# Patient Record
Sex: FEMALE | Race: WHITE | NOT HISPANIC OR LATINO | Employment: OTHER | URBAN - METROPOLITAN AREA
[De-identification: names, ages, dates, MRNs, and addresses within clinical notes are randomized per-mention and may not be internally consistent; named-entity substitution may affect disease eponyms.]

---

## 2023-03-29 DIAGNOSIS — E11.65 TYPE 2 DIABETES MELLITUS WITH HYPERGLYCEMIA, WITHOUT LONG-TERM CURRENT USE OF INSULIN (MULTI): Primary | ICD-10-CM

## 2023-03-29 DIAGNOSIS — T78.40XS ALLERGY, SEQUELA: Primary | ICD-10-CM

## 2023-03-29 RX ORDER — LANCETS
1 EACH MISCELLANEOUS DAILY
Qty: 100 EACH | Refills: 0 | Status: SHIPPED | OUTPATIENT
Start: 2023-03-29 | End: 2023-05-24

## 2023-03-29 RX ORDER — LANCETS
1 EACH MISCELLANEOUS DAILY
COMMUNITY
Start: 2023-02-05 | End: 2023-03-29 | Stop reason: SDUPTHER

## 2023-03-30 RX ORDER — FLUTICASONE PROPIONATE 50 MCG
SPRAY, SUSPENSION (ML) NASAL
Qty: 9.9 G | Refills: 10 | Status: SHIPPED | OUTPATIENT
Start: 2023-03-30 | End: 2024-05-06

## 2023-04-04 DIAGNOSIS — E11.65 TYPE 2 DIABETES MELLITUS WITH HYPERGLYCEMIA, WITHOUT LONG-TERM CURRENT USE OF INSULIN (MULTI): Primary | ICD-10-CM

## 2023-04-04 RX ORDER — PEN NEEDLE, DIABETIC 29 G X1/2"
NEEDLE, DISPOSABLE MISCELLANEOUS
Qty: 100 EACH | Refills: 11 | Status: SHIPPED | OUTPATIENT
Start: 2023-04-04 | End: 2024-04-03

## 2023-05-03 PROBLEM — K43.9 VENTRAL HERNIA: Status: ACTIVE | Noted: 2023-05-03

## 2023-05-03 PROBLEM — F39: Status: ACTIVE | Noted: 2023-05-03

## 2023-05-03 PROBLEM — F42.9 OBSESSIVE-COMPULSIVE DISORDER: Status: ACTIVE | Noted: 2023-05-03

## 2023-05-03 PROBLEM — D72.819 LEUKOPENIA: Status: ACTIVE | Noted: 2023-05-03

## 2023-05-03 PROBLEM — J02.9 SORE THROAT: Status: ACTIVE | Noted: 2023-05-03

## 2023-05-03 PROBLEM — R19.7 DIARRHEA: Status: ACTIVE | Noted: 2023-05-03

## 2023-05-03 PROBLEM — N95.0 PMB (POSTMENOPAUSAL BLEEDING): Status: ACTIVE | Noted: 2023-05-03

## 2023-05-03 PROBLEM — E53.9 VITAMIN B DEFICIENCY: Status: ACTIVE | Noted: 2023-05-03

## 2023-05-03 PROBLEM — E03.9 HYPOTHYROIDISM: Status: ACTIVE | Noted: 2023-05-03

## 2023-05-03 PROBLEM — R51.9 HEADACHE: Status: ACTIVE | Noted: 2023-05-03

## 2023-05-03 PROBLEM — K21.9 CHRONIC GERD: Status: ACTIVE | Noted: 2023-05-03

## 2023-05-03 PROBLEM — R25.1 TREMOR: Status: ACTIVE | Noted: 2023-05-03

## 2023-05-03 PROBLEM — M21.619 BUNION: Status: ACTIVE | Noted: 2023-05-03

## 2023-05-03 PROBLEM — F51.05 INSOMNIA RELATED TO ANOTHER MENTAL DISORDER: Status: ACTIVE | Noted: 2023-05-03

## 2023-05-03 PROBLEM — G47.9 SLEEP DISTURBANCES: Status: ACTIVE | Noted: 2023-05-03

## 2023-05-03 PROBLEM — F32.A DEPRESSION: Status: ACTIVE | Noted: 2023-05-03

## 2023-05-03 PROBLEM — H04.123 DRY EYES: Status: ACTIVE | Noted: 2023-05-03

## 2023-05-03 PROBLEM — Q90.9 TRISOMY 21, DOWN SYNDROME (HHS-HCC): Status: ACTIVE | Noted: 2023-05-03

## 2023-05-03 PROBLEM — J30.2 SEASONAL ALLERGIES: Status: ACTIVE | Noted: 2023-05-03

## 2023-05-03 PROBLEM — E55.9 VITAMIN D DEFICIENCY: Status: ACTIVE | Noted: 2023-05-03

## 2023-05-03 PROBLEM — K46.9 ABDOMINAL HERNIA: Status: ACTIVE | Noted: 2023-05-03

## 2023-05-03 PROBLEM — K13.0 CRACKED LIPS: Status: ACTIVE | Noted: 2023-05-03

## 2023-05-03 PROBLEM — R01.1 SYSTOLIC MURMUR: Status: ACTIVE | Noted: 2023-05-03

## 2023-05-03 PROBLEM — G47.30 SLEEP APNEA: Status: ACTIVE | Noted: 2023-05-03

## 2023-05-03 PROBLEM — E11.9 DIABETES MELLITUS (MULTI): Status: ACTIVE | Noted: 2023-05-03

## 2023-05-03 PROBLEM — R26.81 GAIT INSTABILITY: Status: ACTIVE | Noted: 2023-05-03

## 2023-05-03 PROBLEM — F42.4 DERMATILLOMANIA: Status: ACTIVE | Noted: 2023-05-03

## 2023-05-03 PROBLEM — E78.5 DYSLIPIDEMIA: Status: ACTIVE | Noted: 2023-05-03

## 2023-05-03 PROBLEM — E04.1 THYROID NODULE: Status: ACTIVE | Noted: 2023-05-03

## 2023-05-03 PROBLEM — R45.4 IRRITABILITY: Status: ACTIVE | Noted: 2023-05-03

## 2023-05-03 PROBLEM — B37.9 YEAST INFECTION: Status: ACTIVE | Noted: 2023-05-03

## 2023-05-03 PROBLEM — R63.5 WEIGHT GAIN: Status: ACTIVE | Noted: 2023-05-03

## 2023-05-03 PROBLEM — Q23.1 BICUSPID AORTIC VALVE (HHS-HCC): Status: ACTIVE | Noted: 2023-05-03

## 2023-05-03 PROBLEM — H90.0 CONDUCTIVE HEARING LOSS, BILATERAL: Status: ACTIVE | Noted: 2023-05-03

## 2023-05-03 PROBLEM — Q23.81 BICUSPID AORTIC VALVE: Status: ACTIVE | Noted: 2023-05-03

## 2023-05-03 PROBLEM — G47.19 EXCESSIVE DAYTIME SLEEPINESS: Status: ACTIVE | Noted: 2023-05-03

## 2023-05-03 PROBLEM — F71 MODERATE INTELLECTUAL DISABILITY WITH INTELLIGENCE QUOTIENT 35 TO 49: Status: ACTIVE | Noted: 2023-05-03

## 2023-05-04 ENCOUNTER — OFFICE VISIT (OUTPATIENT)
Dept: PRIMARY CARE | Facility: CLINIC | Age: 58
End: 2023-05-04
Payer: MEDICARE

## 2023-05-04 VITALS
BODY MASS INDEX: 32.71 KG/M2 | SYSTOLIC BLOOD PRESSURE: 112 MMHG | OXYGEN SATURATION: 98 % | HEIGHT: 56 IN | DIASTOLIC BLOOD PRESSURE: 80 MMHG | RESPIRATION RATE: 16 BRPM | TEMPERATURE: 97.2 F | HEART RATE: 56 BPM | WEIGHT: 145.4 LBS

## 2023-05-04 DIAGNOSIS — E11.65 TYPE 2 DIABETES MELLITUS WITH HYPERGLYCEMIA, WITHOUT LONG-TERM CURRENT USE OF INSULIN (MULTI): ICD-10-CM

## 2023-05-04 DIAGNOSIS — Z12.31 ENCOUNTER FOR SCREENING MAMMOGRAM FOR MALIGNANT NEOPLASM OF BREAST: Primary | ICD-10-CM

## 2023-05-04 DIAGNOSIS — F39 PSYCHOSIS, AFFECTIVE (CMS-HCC): ICD-10-CM

## 2023-05-04 DIAGNOSIS — M54.6 ACUTE LEFT-SIDED THORACIC BACK PAIN: ICD-10-CM

## 2023-05-04 DIAGNOSIS — Q90.9 TRISOMY 21, DOWN SYNDROME (HHS-HCC): ICD-10-CM

## 2023-05-04 DIAGNOSIS — E11.69 TYPE 2 DIABETES MELLITUS WITH OTHER SPECIFIED COMPLICATION, WITHOUT LONG-TERM CURRENT USE OF INSULIN (MULTI): ICD-10-CM

## 2023-05-04 PROBLEM — B37.9 YEAST INFECTION: Status: RESOLVED | Noted: 2023-05-03 | Resolved: 2023-05-04

## 2023-05-04 PROBLEM — G47.9 SLEEP DISTURBANCES: Status: RESOLVED | Noted: 2023-05-03 | Resolved: 2023-05-04

## 2023-05-04 PROBLEM — R51.9 HEADACHE: Status: RESOLVED | Noted: 2023-05-03 | Resolved: 2023-05-04

## 2023-05-04 PROBLEM — R01.1 SYSTOLIC MURMUR: Status: RESOLVED | Noted: 2023-05-03 | Resolved: 2023-05-04

## 2023-05-04 PROBLEM — J02.9 SORE THROAT: Status: RESOLVED | Noted: 2023-05-03 | Resolved: 2023-05-04

## 2023-05-04 LAB — POC HEMOGLOBIN A1C: 6.1 % (ref 4.2–6.5)

## 2023-05-04 PROCEDURE — 1036F TOBACCO NON-USER: CPT | Performed by: INTERNAL MEDICINE

## 2023-05-04 PROCEDURE — 99214 OFFICE O/P EST MOD 30 MIN: CPT | Performed by: INTERNAL MEDICINE

## 2023-05-04 PROCEDURE — 3079F DIAST BP 80-89 MM HG: CPT | Performed by: INTERNAL MEDICINE

## 2023-05-04 PROCEDURE — 83036 HEMOGLOBIN GLYCOSYLATED A1C: CPT | Performed by: INTERNAL MEDICINE

## 2023-05-04 PROCEDURE — 3074F SYST BP LT 130 MM HG: CPT | Performed by: INTERNAL MEDICINE

## 2023-05-04 RX ORDER — ERYTHROMYCIN 5 MG/G
OINTMENT OPHTHALMIC
COMMUNITY
Start: 2021-04-05

## 2023-05-04 RX ORDER — OLANZAPINE 2.5 MG/1
1 TABLET ORAL NIGHTLY
COMMUNITY
Start: 2021-02-15 | End: 2023-11-09 | Stop reason: SDUPTHER

## 2023-05-04 RX ORDER — EMPAGLIFLOZIN 10 MG/1
1 TABLET, FILM COATED ORAL DAILY
COMMUNITY
Start: 2022-10-12 | End: 2023-09-28

## 2023-05-04 RX ORDER — GUAIFENESIN 600 MG/1
TABLET, EXTENDED RELEASE ORAL
COMMUNITY
Start: 2021-08-05

## 2023-05-04 RX ORDER — CETIRIZINE HYDROCHLORIDE 10 MG/1
1 TABLET ORAL DAILY
COMMUNITY
Start: 2021-06-10 | End: 2023-10-26

## 2023-05-04 RX ORDER — ACETAMINOPHEN 500 MG
TABLET ORAL
COMMUNITY
Start: 2021-02-11

## 2023-05-04 RX ORDER — ASPIRIN 81 MG/1
1 TABLET ORAL DAILY
COMMUNITY
Start: 2022-08-05 | End: 2023-10-26

## 2023-05-04 RX ORDER — ROSE OIL
OIL (ML) MISCELLANEOUS
COMMUNITY

## 2023-05-04 RX ORDER — ERGOCALCIFEROL 1.25 MG/1
50000 CAPSULE ORAL
COMMUNITY
Start: 2013-01-07 | End: 2023-10-26

## 2023-05-04 RX ORDER — CICLOPIROX OLAMINE 7.7 MG/G
CREAM TOPICAL
COMMUNITY
Start: 2023-04-06

## 2023-05-04 RX ORDER — TALC
POWDER (GRAM) TOPICAL
COMMUNITY
Start: 2020-12-01 | End: 2023-11-09 | Stop reason: SDUPTHER

## 2023-05-04 RX ORDER — FENOFIBRATE 145 MG/1
145 TABLET, FILM COATED ORAL DAILY
COMMUNITY
Start: 2021-11-24 | End: 2023-10-26

## 2023-05-04 RX ORDER — AMMONIUM LACTATE 12 G/100G
CREAM TOPICAL
COMMUNITY
Start: 2022-06-15

## 2023-05-04 RX ORDER — KETOCONAZOLE 20 MG/G
CREAM TOPICAL
COMMUNITY
Start: 2022-02-15

## 2023-05-04 RX ORDER — LEVOTHYROXINE SODIUM 50 UG/1
50 TABLET ORAL
COMMUNITY
Start: 2021-02-18 | End: 2023-10-26

## 2023-05-04 RX ORDER — OMEPRAZOLE 20 MG/1
20 CAPSULE, DELAYED RELEASE ORAL
COMMUNITY
Start: 2013-07-18 | End: 2023-10-26

## 2023-05-04 RX ORDER — SIMVASTATIN 40 MG/1
40 TABLET, FILM COATED ORAL NIGHTLY
COMMUNITY
Start: 2021-10-19 | End: 2023-10-26

## 2023-05-04 RX ORDER — LANOLIN ALCOHOL/MO/W.PET/CERES
1 CREAM (GRAM) TOPICAL DAILY
COMMUNITY
Start: 2021-11-24 | End: 2023-10-26

## 2023-05-04 ASSESSMENT — ENCOUNTER SYMPTOMS
NERVOUS/ANXIOUS: 0
ARTHRALGIAS: 1
DYSURIA: 0
POLYDIPSIA: 0
UNEXPECTED WEIGHT CHANGE: 0
EYE PAIN: 0
FREQUENCY: 0
CHILLS: 0
DIZZINESS: 0
MYALGIAS: 0
POLYPHAGIA: 0
DIARRHEA: 0
NAUSEA: 0
BLOOD IN STOOL: 0
ABDOMINAL PAIN: 0
RHINORRHEA: 0
CHEST TIGHTNESS: 0
SHORTNESS OF BREATH: 0
CONSTIPATION: 0
PALPITATIONS: 0
COUGH: 0
WOUND: 0
HEMATURIA: 0
DYSPHORIC MOOD: 0
FEVER: 0
WHEEZING: 0
SORE THROAT: 0
VOMITING: 0
HEADACHES: 0

## 2023-05-04 NOTE — PATIENT INSTRUCTIONS
She is due for labs before her next visit in 4 months. This is fasting!    I am ordering an xray of thoracic spine  If back continues to hurt, let us know    A1c is 6.1 which is great!    She is due for mammogram in September

## 2023-05-04 NOTE — PROGRESS NOTES
"Subjective   Patient ID: Ene Grant is a 57 y.o. female who presents for Follow-up (3 month follow up and A1C check.//The house noticed that pt has a lump on her back that does not seem to hurt her unless its pressed on, pt seems derm next week.).    HPI     Here with Bri  Have May sugars and 100s in AM and 130-150 in evening  Maira states feels good  She has a pain on back. Only noticed when staff helping with bra. She has a mole and sees derm. ? Of a bump  Maira denies pain or injury until pressed      Review of Systems   Constitutional:  Negative for chills, fever and unexpected weight change.   HENT:  Negative for congestion, hearing loss, rhinorrhea and sore throat.    Eyes:  Negative for pain and visual disturbance.   Respiratory:  Negative for cough, chest tightness, shortness of breath and wheezing.    Cardiovascular:  Negative for chest pain and palpitations.   Gastrointestinal:  Negative for abdominal pain, blood in stool, constipation, diarrhea, nausea and vomiting.   Endocrine: Negative for cold intolerance, heat intolerance, polydipsia and polyphagia.   Genitourinary:  Negative for dysuria, frequency and hematuria.   Musculoskeletal:  Positive for arthralgias. Negative for myalgias.   Skin:  Negative for rash and wound.   Neurological:  Negative for dizziness, syncope and headaches.   Psychiatric/Behavioral:  Negative for dysphoric mood. The patient is not nervous/anxious.        Objective   /80   Pulse 56   Temp 36.2 °C (97.2 °F)   Resp 16   Ht 1.422 m (4' 8\")   Wt 66 kg (145 lb 6.4 oz)   SpO2 98%   BMI 32.60 kg/m²     Physical Exam  Constitutional:       Appearance: Normal appearance.      Comments: Down syndrome faces   Cardiovascular:      Rate and Rhythm: Normal rate and regular rhythm.      Heart sounds: Normal heart sounds. No murmur heard.     No gallop.   Pulmonary:      Effort: Pulmonary effort is normal. No respiratory distress.      Breath sounds: Normal breath sounds. "   Musculoskeletal:      Left lower leg: No edema.   Skin:     Comments: Irregular mole on back  Ttp just right of mole. No lump. No deformities. ? Slight swelling   Neurological:      Mental Status: She is alert.         Assessment/Plan   Problem List Items Addressed This Visit          Endocrine/Metabolic    Diabetes mellitus (CMS/HCC)    Relevant Orders    Hemoglobin A1C    Lipid Panel    Albumin , Urine Random    Comprehensive Metabolic Panel    POCT glycosylated hemoglobin (Hb A1C) manually resulted (Completed)       Other    Psychosis, affective (CMS/HCC)    Relevant Orders    CBC    Trisomy 21, Down syndrome     Other Visit Diagnoses       Encounter for screening mammogram for malignant neoplasm of breast    -  Primary    Relevant Orders    BI mammo bilateral screening tomosynthesis    Acute left-sided thoracic back pain        Relevant Orders    XR thoracic spine 3 views          Thoracic back pain: check xray  -? [facility]  -advised mole should not cause pain but agree with getting the mole checked  -if continues-will recheck    Ventral hernia: saw surgery for this    Tremor: med induced, saw neuro, from zyprexa/rexulti  -resolved    Thyroid nodule: s/p left sided thyroidectomy  -healing well  -TSH WNL 12/22 at ENT at [facility]    Type 2 diabetes: 12/20: 6.0--> 6.8--> 7.1--> 6.8--> 7.7--> 7.6--> 6.6--> 6.1  -Higher doses of metformin cause diarrhea  -On metformin and Jardiance  -saw endo and they agree with our plan (family wanted her to see)  -f/u 3 months    Biscupid aortic valve with AS: seeing Dr. Lewis  -asymptomatic    Leukopenia: saw heme and med induced  -monitor    Hyperlipidemia: on simvastatin and fenofibrate  -8/22 controlled    Hypothyroidism: on synthroid  -12/22 controlled, getting checked with ENT    Behavioral issues: sees psych  -on zoloft and rexulti    Down syndrome: lives at [facility]    Vitamin b12/D deficiency: WNL 12/20    Headache: prn tylenol  GERD: on PPI    Allergies: on zyrtec and  flonase    Return 4 months for a1c and labs before  Eye doc; Dr. Gonzalez  GI: Dr. Ruiz ([facility])  Derm: Dr. Oneil  ENT-- Dr. YESSICA Bob    Health Maintenance  -Pap smear: sees gyn  -Vaccinations: flu shot UTD. Pneumovax booster, shingrix, and tdap (2012). Advised tdap  -Mammogram: 9/22---neg, given order for August  -Colonoscopy: 11/9/17-normal, repeat 10 years (Dr. Ruiz)-records received  -DEXA: at 66

## 2023-05-08 ENCOUNTER — TELEPHONE (OUTPATIENT)
Dept: PRIMARY CARE | Facility: CLINIC | Age: 58
End: 2023-05-08
Payer: MEDICARE

## 2023-05-08 NOTE — TELEPHONE ENCOUNTER
----- Message from Micki Matthew MD sent at 5/7/2023  5:14 PM EDT -----  Will you let [facility] know xray shows arthritis? If continues to complain over pain over the next few weeks-let us know

## 2023-05-24 DIAGNOSIS — E11.65 TYPE 2 DIABETES MELLITUS WITH HYPERGLYCEMIA, WITHOUT LONG-TERM CURRENT USE OF INSULIN (MULTI): ICD-10-CM

## 2023-05-24 RX ORDER — LANCETS
EACH MISCELLANEOUS
Qty: 100 EACH | Refills: 0 | Status: SHIPPED | OUTPATIENT
Start: 2023-05-24 | End: 2023-07-07 | Stop reason: SDUPTHER

## 2023-06-16 DIAGNOSIS — Z00.00 ROUTINE GENERAL MEDICAL EXAMINATION AT A HEALTH CARE FACILITY: Primary | ICD-10-CM

## 2023-06-16 RX ORDER — BLOOD SUGAR DIAGNOSTIC
STRIP MISCELLANEOUS
COMMUNITY
End: 2023-06-16 | Stop reason: SDUPTHER

## 2023-06-16 RX ORDER — BLOOD SUGAR DIAGNOSTIC
1 STRIP MISCELLANEOUS 2 TIMES DAILY
Qty: 180 STRIP | Refills: 3 | Status: SHIPPED | OUTPATIENT
Start: 2023-06-16 | End: 2023-06-21 | Stop reason: SDUPTHER

## 2023-06-21 DIAGNOSIS — Z00.00 ROUTINE GENERAL MEDICAL EXAMINATION AT A HEALTH CARE FACILITY: ICD-10-CM

## 2023-06-21 RX ORDER — BLOOD SUGAR DIAGNOSTIC
1 STRIP MISCELLANEOUS 2 TIMES DAILY
Qty: 180 STRIP | Refills: 0 | Status: SHIPPED | OUTPATIENT
Start: 2023-06-21 | End: 2023-07-11 | Stop reason: SDUPTHER

## 2023-07-06 DIAGNOSIS — E11.65 TYPE 2 DIABETES MELLITUS WITH HYPERGLYCEMIA, WITHOUT LONG-TERM CURRENT USE OF INSULIN (MULTI): ICD-10-CM

## 2023-07-06 NOTE — TELEPHONE ENCOUNTER
Britni called needing refill for Lancets.  She is completely out.    Pharmacy=  [facility] on [address] in [place] phone [phone]

## 2023-07-07 RX ORDER — LANCETS
EACH MISCELLANEOUS
Qty: 100 EACH | Refills: 0 | Status: SHIPPED | OUTPATIENT
Start: 2023-07-07 | End: 2024-04-12 | Stop reason: SDUPTHER

## 2023-07-11 DIAGNOSIS — Z00.00 ROUTINE GENERAL MEDICAL EXAMINATION AT A HEALTH CARE FACILITY: ICD-10-CM

## 2023-07-11 RX ORDER — BLOOD SUGAR DIAGNOSTIC
1 STRIP MISCELLANEOUS DAILY
Qty: 100 STRIP | Refills: 0 | Status: SHIPPED | OUTPATIENT
Start: 2023-07-11 | End: 2023-09-25

## 2023-07-11 NOTE — TELEPHONE ENCOUNTER
[facility] pharmacy called following up. They need clarification on how many times patient is testing per day. If 1x/day, a new prescription needed, if 2x/day, then pharmacy needs paperwork completed that was sent on 6/21/ PH: [phone]. Please call back with clarification.

## 2023-08-25 ENCOUNTER — PATIENT MESSAGE (OUTPATIENT)
Dept: PRIMARY CARE | Facility: CLINIC | Age: 58
End: 2023-08-25

## 2023-08-25 DIAGNOSIS — R39.9 UTI SYMPTOMS: Primary | ICD-10-CM

## 2023-08-28 ENCOUNTER — APPOINTMENT (OUTPATIENT)
Dept: PRIMARY CARE | Facility: CLINIC | Age: 58
End: 2023-08-28
Payer: MEDICARE

## 2023-09-18 ENCOUNTER — APPOINTMENT (OUTPATIENT)
Dept: PRIMARY CARE | Facility: CLINIC | Age: 58
End: 2023-09-18
Payer: MEDICARE

## 2023-09-23 DIAGNOSIS — Z00.00 ROUTINE GENERAL MEDICAL EXAMINATION AT A HEALTH CARE FACILITY: ICD-10-CM

## 2023-09-25 DIAGNOSIS — E11.69 TYPE 2 DIABETES MELLITUS WITH OTHER SPECIFIED COMPLICATION, WITHOUT LONG-TERM CURRENT USE OF INSULIN (MULTI): ICD-10-CM

## 2023-09-25 DIAGNOSIS — Z00.00 ROUTINE GENERAL MEDICAL EXAMINATION AT A HEALTH CARE FACILITY: ICD-10-CM

## 2023-09-25 RX ORDER — BLOOD SUGAR DIAGNOSTIC
STRIP MISCELLANEOUS
Qty: 100 STRIP | Refills: 2 | Status: SHIPPED | OUTPATIENT
Start: 2023-09-25 | End: 2023-09-25 | Stop reason: SDUPTHER

## 2023-09-25 RX ORDER — BLOOD SUGAR DIAGNOSTIC
STRIP MISCELLANEOUS
Qty: 100 STRIP | Refills: 2 | Status: SHIPPED | OUTPATIENT
Start: 2023-09-25 | End: 2024-04-12 | Stop reason: SDUPTHER

## 2023-09-28 DIAGNOSIS — E11.65 TYPE 2 DIABETES MELLITUS WITH HYPERGLYCEMIA, WITHOUT LONG-TERM CURRENT USE OF INSULIN (MULTI): Primary | ICD-10-CM

## 2023-10-10 ENCOUNTER — APPOINTMENT (OUTPATIENT)
Dept: BEHAVIORAL HEALTH | Facility: CLINIC | Age: 58
End: 2023-10-10
Payer: MEDICARE

## 2023-10-25 DIAGNOSIS — J30.2 SEASONAL ALLERGIES: ICD-10-CM

## 2023-10-25 DIAGNOSIS — E03.9 HYPOTHYROIDISM, UNSPECIFIED TYPE: ICD-10-CM

## 2023-10-25 DIAGNOSIS — E78.5 DYSLIPIDEMIA: ICD-10-CM

## 2023-10-25 DIAGNOSIS — K21.9 CHRONIC GERD: ICD-10-CM

## 2023-10-25 DIAGNOSIS — E53.9 VITAMIN B DEFICIENCY: Primary | ICD-10-CM

## 2023-10-25 DIAGNOSIS — E55.9 VITAMIN D DEFICIENCY: ICD-10-CM

## 2023-10-26 RX ORDER — FENOFIBRATE 145 MG/1
TABLET, FILM COATED ORAL
Qty: 30 TABLET | Refills: 10 | Status: SHIPPED | OUTPATIENT
Start: 2023-10-26

## 2023-10-26 RX ORDER — ASPIRIN 81 MG/1
81 TABLET ORAL DAILY
Qty: 30 TABLET | Refills: 10 | Status: SHIPPED | OUTPATIENT
Start: 2023-10-26 | End: 2024-04-30 | Stop reason: WASHOUT

## 2023-10-26 RX ORDER — OMEPRAZOLE 20 MG/1
20 CAPSULE, DELAYED RELEASE ORAL
Qty: 30 CAPSULE | Refills: 10 | Status: SHIPPED | OUTPATIENT
Start: 2023-10-26

## 2023-10-26 RX ORDER — LANOLIN ALCOHOL/MO/W.PET/CERES
1000 CREAM (GRAM) TOPICAL DAILY
Qty: 30 TABLET | Refills: 10 | Status: SHIPPED | OUTPATIENT
Start: 2023-10-26

## 2023-10-26 RX ORDER — SIMVASTATIN 40 MG/1
40 TABLET, FILM COATED ORAL NIGHTLY
Qty: 30 TABLET | Refills: 10 | Status: SHIPPED | OUTPATIENT
Start: 2023-10-26

## 2023-10-26 RX ORDER — ERGOCALCIFEROL 1.25 1/1
CAPSULE ORAL
Qty: 4 CAPSULE | Refills: 10 | Status: SHIPPED | OUTPATIENT
Start: 2023-10-26

## 2023-10-26 RX ORDER — LEVOTHYROXINE SODIUM 50 UG/1
50 TABLET ORAL
Qty: 30 TABLET | Refills: 10 | Status: SHIPPED | OUTPATIENT
Start: 2023-10-26 | End: 2024-02-28 | Stop reason: SDUPTHER

## 2023-10-26 RX ORDER — CETIRIZINE HYDROCHLORIDE 10 MG/1
10 TABLET ORAL DAILY
Qty: 30 TABLET | Refills: 10 | Status: SHIPPED | OUTPATIENT
Start: 2023-10-26

## 2023-11-09 ENCOUNTER — TELEMEDICINE (OUTPATIENT)
Dept: BEHAVIORAL HEALTH | Facility: CLINIC | Age: 58
End: 2023-11-09
Payer: MEDICARE

## 2023-11-09 VITALS — HEIGHT: 56 IN | RESPIRATION RATE: 16 BRPM | BODY MASS INDEX: 32.6 KG/M2

## 2023-11-09 DIAGNOSIS — F51.05 INSOMNIA RELATED TO ANOTHER MENTAL DISORDER: ICD-10-CM

## 2023-11-09 DIAGNOSIS — F39 PSYCHOSIS, AFFECTIVE (CMS-HCC): ICD-10-CM

## 2023-11-09 DIAGNOSIS — F71 MODERATE INTELLECTUAL DISABILITY WITH INTELLIGENCE QUOTIENT 35 TO 49: ICD-10-CM

## 2023-11-09 DIAGNOSIS — Z79.899 ENCOUNTER FOR LONG-TERM (CURRENT) USE OF HIGH-RISK MEDICATION: ICD-10-CM

## 2023-11-09 PROCEDURE — 99214 OFFICE O/P EST MOD 30 MIN: CPT | Performed by: NURSE PRACTITIONER

## 2023-11-09 RX ORDER — OLANZAPINE 2.5 MG/1
2.5 TABLET ORAL NIGHTLY
Qty: 30 TABLET | Refills: 5 | Status: SHIPPED | OUTPATIENT
Start: 2023-11-09 | End: 2024-02-09 | Stop reason: SDUPTHER

## 2023-11-09 RX ORDER — TALC
6 POWDER (GRAM) TOPICAL NIGHTLY
Qty: 60 TABLET | Refills: 2 | Status: SHIPPED | OUTPATIENT
Start: 2023-11-09 | End: 2024-01-18

## 2023-11-09 NOTE — PROGRESS NOTES
ASSESSMENT: Ms. Grant presents with increased resistiveness to requests, forgetfulness including her own name, and decreased sleep.   See treatment plan below.     PLAN:                                                                                                                         problems treated   f/u requested to prevent relapse   medications renewed/re-ordered     1. Continue Olanzapine (Zyprexa) 2.5 mg by mouth at bedtime for psychosis.  2.  Increase melatonin 6 mg (from 3 mg) by mouth at bedtime for sleep disturbances.  If not effective within 2 weeks, staff may contact this clinician and trazodone 25 mg by mouth at bedtime will be started.  3. Risks, benefits, alternatives reviewed. Guardian and caregiver perceive the benefits outweigh the risks.   4. Return to clinic in 3 months or earlier if needed. Call [phone] to reschedule.  5.  Prescription for EKG given.  Has cardiologist for aortic regurgitation.  Monitoring QTc prolongation related to olanzapine.  6.  Refer to neurology for diagnosis of dementia.  This clinician had previously prescribed donepezil, which had been declined by guardian.    Thank you for seeing me today.  If you have any questions or concerns, do not hesitate to contact my office.  Nel Prieto     TREATMENT TYPE                                      counseling and coordination of care, addressing signs and symptoms of illness; risks/benefits and side effects of medications; and behavioral approaches to illness.  This note was created using electronic dictation. There may be errors in syntax and meaning. Please contact the office with any questions.   For [facility] residents, [employer] is a 24/7 hotline you can call for assistance [[phone]].   Please call 911 or go to your closest Emergency Room if you feel worse. This includes thoughts of hurting yourself or anyone else, or having other troubles such as hearing voices, seeing visions, or having new and  scary thoughts about the people around you.      Provider Impressions     PRESENT FOR APPOINTMENT  client  Caregiver Alejandra Bowie, known since December 2022  Not present: Guardian/Sister Mickie Watson     SUBJECTIVE 58 year-old  single female with a history of moderate ID, psychosis, MDD, OCD, and sleep disturbances and PmHx diabetes, GERD, aortic regurgitation, hyperlipidemia, hypertension, hypothyroidism, ventral hernia, vitamin D, vitamin B12 deficiency, Down syndrome, and obesity presenting for medication management.  Had been living in [facility], but moved to assisted living Oct 2018. Still through [facility].      Last seen January July 2022. At that time, Zyprexa was decreased to find the lowest effective dose.  January 2021. At that time, no medication changes.  November 2021. At that time, Olanzapine (Zyprexa) was restarted.  October 2021. At that time, Melatonin was started & Olanzapine was discontinued (Guardian request as stated never saw symptoms of psychosis).  August 2021. At that time, Discontinue Melatonin    May 2021. At that time, Olanzapine was increased. In interim, July 29, 2021, Sertraline was DC'd by Neuro.   February 2021. At that time, Rexulti was DC'd and Olanzapine was started.  December 2020. At that time, Rexulti was decreased.  August 2020. At that time, Rexulti was increased.  June 2020. At that time, Rexulti was increased.  November 2019. At that time, Rexulti was increased.  September 2019. At that time, Rexulti was started.  July 2019. At that time, no med changes.  April 2019. At that time, no med changes.  November 2018. At that time, Abilify was started, Zoloft and donepezil were increased. All changes were refused by Guardian.  July 30, 2018. At that time, no med changes.   July 2, 2018 for initial evaluation. At that time, Donepezil and Zoloft were started.      Maira presents eating [employer].  The bribe of talk about is the only way that she would agree to  "appointments, even a virtual appointment.  She happily waves hello into the camera.  She shakes her head yes to that her nails look pretty and shows her newly manicured males to this clinician.  Caregiver reports she has run out of her olanzapine and they have noted an increase and visual hallucinations of seeing people in her shoes.  However, even on olanzapine she still had these hallucinations.  Sleep: Frequent awakenings, even with restart of melatonin 3 mg.  Memory: Staff report rapid progression of memory loss.  She has forgotten her own name at times and her favored staff.  They are very concerned.  Reviewed chart, where she had been on donepezil prior to meeting this clinician and how donepezil was restarted by this clinician at initial evaluation.  However, due to request of guardian, it was discontinued.  Staff will contact neurologist for official diagnosis.    No longer having screaming episodes.  History: Sleeping well at night. CPAP was DC\"d in Feb 2020 DT not using.   internal stim- \"little friends\" not problematic. No longer pounding reported in her room dt frustrated with voices. Ct reports that the devil upsets her. Has names for these \"friends.\" She does not like when staff try to talk to these friends.   Day Program: ([facility]) [facility]: M, W, F: she enjoys activities and remains social.   Fine hand tremor noted, which had resolved with the restart of Olanzapine. None with decrease to 2.5 mg.     ENT Dr Femi Bob= Biopsy of Thyroid; Bariatric consult- no changes; PCP increased Metformin.   Resided at [facility]- had to watch movies alone in the basement     While on Olanzapine: No longer cowering in her bedroom.   She is is more social, spending time in the shared living space, participating in crafts.   She continues to enjoy reading her books and coloring.     Her sister, Shwetha, takes her home every other weekend. May be for the day or the whole weekend. " "     HX:  1. boundary issues at prior GH ([facility]). Especially with male staff. Her new home has mostly female staff and they have not noted any obsessions or boundary issues.  2. OCD: Has set routines. If her ADL's or activities are interrupted, she will refuse to do anything. When this happened, the staff had to call her sister for help, decreased sleep dt reports of trying to place \"Peter\" the child that she sees and hears in a closet.  She is also very methodical/particular in the way she gets dressed/prepared for the day.     MEDICATIONS: Past: Abilify, Aricept, and Amaryl (?= MELLARILL?)  Psy/SI/HI/aggression: +AH that causes her to have head pain.  .   Med Physicians:  PCP: Dr. Micki Matthew  Hematology/Oncology: Dr. Nieves at . referred by Dr. Micki Vences for further evaluation and   management of leukopenia on 02/24/2021.   Neurologist/movement disorders: Dr. Forde at   Cardiologist: Dr. Beth Verdugo= Dr. eKmi Lewis  Dermatologist: Dr. Madhav Oneil  Podiatrist: Dr. Edwin Wong  Dentist: Dr Anna Joshua  GYN: Dr Joanne Dickey at   ENT Dr Femi Bob= Biopsy of Thyroid  Gastro Dr. aCrmenza Ruiz  [place] [facility] [place]ana [place] [facility] [place]  Bariatric : Dr. Chas Jett  Urgent Care Jan 2020 for UTI  Urgent Care Feb 2020 for SOB rt allergies   3/13/19 after staff states tried to get her ready for Day Program & \"She was just out of it.\" DX hypo glucose.   Metformin was decreased & Glimepiride (Amaryl) was DC'd.      ALLERGIES: NKDA     FAMILY HISTORY: Lived with mother prior to GH     Med SE: none reported  COMPLIANCE: good     EPS: mouth dryness, some finger intertwining, but only noted with self talk.  AIMS negative     LABS REVIEWED:  May 2023 in chart  August 2022 in chart  May 2021 in chart  May 2018: reviewed results of CMP, CBC/Diff, GFR, & TSH     EKG    Cardiologist  aortic regurgitation, hypertension  December 2021 in chart QTc 409 " MS    History of Present IllnessFamily history: - Psychiatric diagnosis: denies primary relatives with serious mental illness (SA) and/or substance use disorders.   Parents: Mother: Kiera. She lived with her mother and own apartment living before moving into .   Siblings: Sisters: reconnected with in 2018. 2/3 has been talking to weekly.: Shwetha Delgado, States that Kalpesh is not her family.   Personal history: Uncle Toribio, whom lives out of state, is her POA. Her sister is her Guardian.  Birth: Unknown  Development: Down's Syndrome  Abuse: suspected by staff  Orientation  Marriage/partner: none  Alcohol: unknown   Tobacco: unknown  Drugs: unknown  Prescribed: see below  Recreational: enjoys drawing: had 4 pictures:witches hands, baby devil, witches broom  Past psychiatric: ct states that she has seen before         Initial Fall Risk Screening:   REGULO has not fallen in the last 6 months.    Tobacco Screening: REGULO does not use tobacco.   Diabetes Evaluation:   HbA1c > or = 7%; < 8%.   Blood Pressure monitoring   Blood Pressure Controlled, Systolic <130 mm Hg   Blood Pressure Controlled, Diastolic < 80mm Hg        Review of Systems     Psychiatric: as noted in HPI.       Constitutional: sleep apnea.   Eyes: vision tested , vision impairment and wears glasses/contacts.   ENT: hearing tested, hearing loss, hearing aid  and dental problems.   Cardiovascular: heart defect.   Respiratory: no wheezing.   Gastrointestinal: diarrhea, but no constipation, no abdominal pain, no nausea, no vomiting and no reflux.   Genitourinary: no incontinence.   Musculoskeletal: moving all extremities well and symmetrical.   Integumentary: no rashes.   Neurological: headache and seizures.   Endocrine:. DM.   Hematologic/Lymphatic: no anemia.     Mental Status Exam     Appearance: well-groomed.   Build:. short stature.   Demeanor: average.   Eye Contact: average.   Motor Activity: average.   Speech: slurred.   Language: impoverished  language.   Fund of Knowledge: poor fund of knowledge.   Delusions: As noted in HPI.   Self Harm: None Reported.   Aggressive: None Reported.   Mood: euthymic.   Affect: full.   Orientation: alert.   Manner: cooperative.   Thought process: concrete.   Content of thought: As noted in HPI   Abstract/ Rational Thought: mild impairment   Memory: short-term impaired, long-term impaired.   Behavior: normal motor activity.   Attention/Concentration: normal.   Insight: mild impairment.   Judgement: mild impairment.   Musculoskeletal: normal strength and tone.

## 2023-11-30 ENCOUNTER — APPOINTMENT (OUTPATIENT)
Dept: PRIMARY CARE | Facility: CLINIC | Age: 58
End: 2023-11-30
Payer: MEDICARE

## 2023-12-28 ENCOUNTER — APPOINTMENT (OUTPATIENT)
Dept: RADIOLOGY | Facility: CLINIC | Age: 58
End: 2023-12-28
Payer: MEDICARE

## 2024-01-12 ENCOUNTER — ANCILLARY PROCEDURE (OUTPATIENT)
Dept: RADIOLOGY | Facility: CLINIC | Age: 59
End: 2024-01-12
Payer: MEDICARE

## 2024-01-12 DIAGNOSIS — Z12.31 ENCOUNTER FOR SCREENING MAMMOGRAM FOR MALIGNANT NEOPLASM OF BREAST: ICD-10-CM

## 2024-01-12 PROCEDURE — 77067 SCR MAMMO BI INCL CAD: CPT | Mod: BILATERAL PROCEDURE | Performed by: RADIOLOGY

## 2024-01-12 PROCEDURE — 77063 BREAST TOMOSYNTHESIS BI: CPT

## 2024-01-12 PROCEDURE — 77063 BREAST TOMOSYNTHESIS BI: CPT | Mod: BILATERAL PROCEDURE | Performed by: RADIOLOGY

## 2024-01-15 ENCOUNTER — TELEPHONE (OUTPATIENT)
Dept: PRIMARY CARE | Facility: CLINIC | Age: 59
End: 2024-01-15
Payer: MEDICARE

## 2024-01-15 NOTE — TELEPHONE ENCOUNTER
----- Message from Micki Matthew MD sent at 1/14/2024 10:34 AM EST -----  Will you let [facility] know they missed a view when doing mammogram and they want her to return for views--there is no additional charge. [phone] and if issues let  me know. No new order is needed

## 2024-01-17 DIAGNOSIS — F51.05 INSOMNIA RELATED TO ANOTHER MENTAL DISORDER: ICD-10-CM

## 2024-01-18 RX ORDER — TALC
POWDER (GRAM) TOPICAL
Qty: 34 TABLET | Refills: 0 | Status: SHIPPED | OUTPATIENT
Start: 2024-01-18 | End: 2024-02-09 | Stop reason: SDUPTHER

## 2024-01-23 ENCOUNTER — OFFICE VISIT (OUTPATIENT)
Dept: NEUROLOGY | Facility: CLINIC | Age: 59
End: 2024-01-23
Payer: MEDICARE

## 2024-01-23 VITALS
HEIGHT: 60 IN | RESPIRATION RATE: 20 BRPM | WEIGHT: 127.6 LBS | SYSTOLIC BLOOD PRESSURE: 93 MMHG | DIASTOLIC BLOOD PRESSURE: 55 MMHG | TEMPERATURE: 97.2 F | HEART RATE: 69 BPM | BODY MASS INDEX: 25.05 KG/M2

## 2024-01-23 DIAGNOSIS — R41.3 MEMORY LOSS OF UNKNOWN CAUSE: ICD-10-CM

## 2024-01-23 DIAGNOSIS — E55.9 VITAMIN D DEFICIENCY: Primary | ICD-10-CM

## 2024-01-23 DIAGNOSIS — E11.65 TYPE 2 DIABETES MELLITUS WITH HYPERGLYCEMIA, WITHOUT LONG-TERM CURRENT USE OF INSULIN (MULTI): ICD-10-CM

## 2024-01-23 DIAGNOSIS — R41.89 COGNITIVE IMPAIRMENT: Primary | ICD-10-CM

## 2024-01-23 PROCEDURE — 1036F TOBACCO NON-USER: CPT | Performed by: PSYCHIATRY & NEUROLOGY

## 2024-01-23 PROCEDURE — 99215 OFFICE O/P EST HI 40 MIN: CPT | Performed by: PSYCHIATRY & NEUROLOGY

## 2024-01-23 PROCEDURE — 3078F DIAST BP <80 MM HG: CPT | Performed by: PSYCHIATRY & NEUROLOGY

## 2024-01-23 PROCEDURE — 3074F SYST BP LT 130 MM HG: CPT | Performed by: PSYCHIATRY & NEUROLOGY

## 2024-01-23 NOTE — PATIENT INSTRUCTIONS
"It was a pleasure seeing you today.     I want you to get bloodwork.     Please make a follow up appointment.     For any urgent issues or needing to speak to a medical assistant please call [phone], option 6 during our office hours Monday-Friday 8am-4pm, and leave a voicemail with your concern.  My office will try to reach back you as soon as possible within 24 (business) hours.  If you have an emergency please call 911 or visit a local urgent care or nearest emergency room.      Please understand that [email or web address] is a useful communication tool for simple \"normal\" results or a refill request but I would not recommend using this tool for emergent or urgent issues or for conversations with me.  I am happy to ask my staff to rearrange a follow up visit or a virtual visit sooner than requested if appropriate for your care.     "

## 2024-01-23 NOTE — PROGRESS NOTES
Subjective     Ene Grant is a 58 y.o. year old female here for new problem, memory issues and dementia evaluation.    HPI  Patient was last seen July 2021 for medication induced tremors.  She has a history of Down syndrome, MDD, psychosis, OCD and sleep issues.  There is a note in chart stating patient had a hard time writing which was atypical for her.  She likes to draw. She has no tremors now.  No walking issues.   Patient follows with psychiatry - on Zyprexa.  Hx of vit b12 def and hypothyroidism.   She forgot how to write her name, has become argumentative with staff and kicking/ yelling more often.  Bloodwork most recent was 2022.  PCP ordered bloodtests in May.  Review of Systems  Denies syncope, denies HA.  Denies vision changes.  Denies numbness/tingling.  Denies chest pain or fatigue.    Patient Active Problem List   Diagnosis    Bicuspid aortic valve    Bunion    Chronic GERD    Conductive hearing loss, bilateral    Cracked lips    Depression    Dermatillomania    Diabetes mellitus (CMS/HCC)    Diarrhea    Dry eyes    Dyslipidemia    Excessive daytime sleepiness    Gait instability    Insomnia related to another mental disorder    Irritability    Leukopenia    Moderate intellectual disability with intelligence quotient 35 to 49    Obsessive-compulsive disorder    PMB (postmenopausal bleeding)    Psychosis, affective (CMS/HCC)    Seasonal allergies    Sleep apnea    Hypothyroidism    Thyroid nodule    Tremor    Trisomy 21, Down syndrome    Abdominal hernia    Ventral hernia    Vitamin B deficiency    Vitamin D deficiency    Weight gain     Past Medical History:   Diagnosis Date    Acute maxillary sinusitis, unspecified 02/03/2014    Acute maxillary sinusitis    Encounter for gynecological examination (general) (routine) without abnormal findings     Pap smear, as part of routine gynecological examination    Encounter for screening for cardiovascular disorders     Encounter for screening for  cardiovascular disorders    Influenza due to other identified influenza virus with other respiratory manifestations 12/29/2014    Influenza A    Otitis media, unspecified, right ear 05/15/2013    Otitis media of right ear    Personal history of other diseases of the respiratory system 12/11/2015    History of acute sinusitis    Personal history of other medical treatment     H/O screening mammography    Unspecified otitis externa, bilateral 05/04/2015    Otitis externa of both ears     Past Surgical History:   Procedure Laterality Date    MOUTH SURGERY  04/28/2014    Oral Surgery Tooth Extraction    OTHER SURGICAL HISTORY  01/11/2014    Vaginal Pap smear    OTHER SURGICAL HISTORY  10/12/2022    Subtotal thyroidectomy     Social History     Tobacco Use    Smoking status: Never    Smokeless tobacco: Never   Substance Use Topics    Alcohol use: Never     family history includes Coronary artery disease in her father; Diabetes in her father; Glaucoma in her father; Hypertension in her father; Lung cancer in her mother.    Current Outpatient Medications:     acetaminophen (Tylenol) 500 mg tablet, Take by mouth., Disp: , Rfl:     ammonium lactate (Amlactin) 12 % cream, Apply to affected area 1-2 times daily as needed, Disp: , Rfl:     aspirin 81 mg EC tablet, TAKE 1 TAB BY MOUTH ONCE DAILY, Disp: 30 tablet, Rfl: 10    benzonatate (Tessalon) 100 mg capsule, , Disp: , Rfl:     blood sugar diagnostic (Accu-Chek Guide test strips) strip, USE 1 TEST STRIP ONCE DAILY, Disp: 100 strip, Rfl: 2    cetirizine (ZyrTEC) 10 mg tablet, TAKE 1 TAB BY MOUTH ONCE DAILY, Disp: 30 tablet, Rfl: 10    ciclopirox (Loprox) 0.77 % cream, , Disp: , Rfl:     cyanocobalamin (Vitamin B-12) 1,000 mcg tablet, TAKE 1 TAB BY MOUTH ONCE DAILY, Disp: 30 tablet, Rfl: 10    dimethicone 1 % ointment, once a day to lips, Disp: , Rfl:     empagliflozin (Jardiance) 10 mg, Take 1 tablet (10 mg) by mouth once daily., Disp: 30 tablet, Rfl: 10    erythromycin  "(Romycin) 5 mg/gram (0.5 %) ophthalmic ointment, Apply to affected eye(s)., Disp: , Rfl:     fenofibrate (Tricor) 145 mg tablet, TAKE 1 TAB BY MOUTH ONCE DAILY WITH FOOD, Disp: 30 tablet, Rfl: 10    fluticasone (Flonase) 50 mcg/actuation nasal spray, INSTILL 2 SPRAYS IN EACH NOSTRIL ONCE DAILY, Disp: 9.9 g, Rfl: 10    guaiFENesin (Mucinex) 600 mg 12 hr tablet, Take by mouth., Disp: , Rfl:     ketoconazole (NIZOral) 2 % cream, APPLY TROPICALLY TO ABDOMINAL DERMATITIS ONCE DAILY AS NEEDED., Disp: , Rfl:     lancets (Accu-Chek Softclix Lancets) misc, USE ONE LANCET  ONCE DAILY, Disp: 100 each, Rfl: 0    levothyroxine (Synthroid, Levoxyl) 50 mcg tablet, TAKE 1 TAB BY MOUTH ONCE DAILY (TAKE AT LEAST 30MIN PRIOR TO EATING), Disp: 30 tablet, Rfl: 10    melatonin 3 mg tablet, TAKE 2 TABS (6MG) BY MOUTH EVERY DAY AT BEDTIME, Disp: 34 tablet, Rfl: 0    metFORMIN XR (Glucophage-XR) 500 mg 24 hr tablet, TAKE 1 TAB BY MOUTH TWICE DAILY, Disp: 60 tablet, Rfl: 11    OLANZapine (ZyPREXA) 2.5 mg tablet, Take 1 tablet (2.5 mg) by mouth once daily at bedtime., Disp: 30 tablet, Rfl: 5    omeprazole (PriLOSEC) 20 mg DR capsule, TAKE 1 CAP BY MOUTH ONCE DAILY, Disp: 30 capsule, Rfl: 10    pen needle 1/2\" 29G X 12mm needle, Use to inject 1-4 times daily as directed., Disp: 100 each, Rfl: 11    reggie oil oil, , Disp: , Rfl:     simvastatin (Zocor) 40 mg tablet, TAKE 1 TAB BY MOUTH EVERY DAY AT BEDTIME, Disp: 30 tablet, Rfl: 10    Vitamin D2 1,250 mcg (50,000 unit) capsule, TAKE 1 CAP BY MOUTH EVERY WEEK, Disp: 4 capsule, Rfl: 10  No Known Allergies  BP 93/55 (BP Location: Right arm)   Pulse 69   Temp 36.2 °C (97.2 °F)   Resp 20   Ht 1.524 m (5')   Wt 57.9 kg (127 lb 9.6 oz)   BMI 24.92 kg/m²   Neurological Exam/Physical Exam:    Constitutional: General appearance: Abnormal Facial features c/w down syndrome   Auscultation of Heart: Regular rate and rhythm, no murmurs, normal S1 and S2.   Carotid Arteries: Intact without any bruits "   Peripheral Vascular Exam: Pulses +2 and equal in all extremities.   Mental status: The patient was in no distress, alert, interactive and cooperative. Affect is flat.   Orientation: oriented to person, oriented to place and oriented to time.   Memory: recent memory impaired and remote memory impaired.   Fund of knowledge: Patient displays adequate knowledge of current events, adequate fund of knowledge regarding past history and adequate fund of knowledge regarding vocabulary. very pleasant. mild dysarthria.   Eyes: The ophthalmoscopic examination was normal. The fundi are visualized with normal disc margins and without hemorrhages   Cranial nerve II: Visual fields full to confrontation.   Cranial nerves III, IV, and VI: Pupils round, equally reactive to light; no ptosis. EOMs intact. No nystagmus.   Cranial Nerve V: Facial sensation intact bilaterally.   Cranial nerve VII: Normal and symmetric facial strength.   Cranial nerve VIII: Hearing is intact bilaterally to finger rub / whisper.   Cranial nerves IX and X: Palate elevates symmetrically.   Cranial nerve XI: Shoulder shrug and neck rotation strength are intact.   Cranial nerve XII: Tongue midline with normal strength.   Motor: Motor exam was normal. Muscle strength was 5/5 throughout. no abnormal or adventitious movements were present.   Deep Tendon Reflexes: left biceps 2+ , right biceps 2+, left triceps 2+, right triceps 2+, left brachioradialis 2+, right brachioradialis 2+, left patella 2+, right patella 2+, left ankle jerk 1+, right ankle jerk 1+   Plantar Reflex: Toes downgoing to plantar stimulation on the left. Toes downgoing to plantar stimulation on the right.   Sensory Exam: Normal to light touch.   Coordination: There is no limb dystaxia and rapid alternating movements are intact.   Gait: antalgic gait.        Labs:  CBC:   Lab Results   Component Value Date    WBC 3.3 (L) 08/03/2022    HGB 14.0 08/03/2022    HCT 43.9 08/03/2022      08/03/2022     BMP:   Lab Results   Component Value Date     08/03/2022    K 4.5 08/03/2022     08/03/2022    CO2 29 08/03/2022    BUN 18 08/03/2022    CREATININE 0.99 08/03/2022    CALCIUM 9.1 08/03/2022     LFT:   Lab Results   Component Value Date    ALKPHOS 38 08/03/2022    BILITOT 0.4 08/03/2022    PROT 6.6 08/03/2022    ALBUMIN 3.6 08/03/2022    ALT 14 08/03/2022    AST 20 08/03/2022       Assessment/Plan   Problem List Items Addressed This Visit    None  Visit Diagnoses         Codes    Cognitive impairment    -  Primary R41.89    Relevant Orders    Vitamin B12    TSH with reflex to Free T4 if abnormal    Memory loss of unknown cause     R41.3    Relevant Orders    TSH with reflex to Free T4 if abnormal         Pt has Down syndrome, depression, behavioral disorder, hx of hypothyroidism and b12 deficiency - will check b12/ TSH.   Patient to follow with psychiatry to adjust any medications as needed for behavioral issues.

## 2024-01-29 ENCOUNTER — HOSPITAL ENCOUNTER (OUTPATIENT)
Dept: RADIOLOGY | Facility: CLINIC | Age: 59
End: 2024-01-29
Payer: MEDICARE

## 2024-02-02 ENCOUNTER — APPOINTMENT (OUTPATIENT)
Dept: RADIOLOGY | Facility: CLINIC | Age: 59
End: 2024-02-02
Payer: COMMERCIAL

## 2024-02-02 DIAGNOSIS — Z12.31 SCREENING MAMMOGRAM FOR BREAST CANCER: ICD-10-CM

## 2024-02-09 ENCOUNTER — OFFICE VISIT (OUTPATIENT)
Dept: BEHAVIORAL HEALTH | Facility: CLINIC | Age: 59
End: 2024-02-09
Payer: MEDICARE

## 2024-02-09 DIAGNOSIS — F51.05 INSOMNIA RELATED TO ANOTHER MENTAL DISORDER: ICD-10-CM

## 2024-02-09 DIAGNOSIS — Z79.899 ENCOUNTER FOR LONG-TERM (CURRENT) USE OF HIGH-RISK MEDICATION: ICD-10-CM

## 2024-02-09 DIAGNOSIS — F39 PSYCHOSIS, AFFECTIVE (CMS-HCC): Primary | ICD-10-CM

## 2024-02-09 DIAGNOSIS — F71 MODERATE INTELLECTUAL DISABILITY WITH INTELLIGENCE QUOTIENT 35 TO 49: ICD-10-CM

## 2024-02-09 PROCEDURE — 99212 OFFICE O/P EST SF 10 MIN: CPT | Performed by: NURSE PRACTITIONER

## 2024-02-09 PROCEDURE — 1036F TOBACCO NON-USER: CPT | Performed by: NURSE PRACTITIONER

## 2024-02-09 RX ORDER — OLANZAPINE 2.5 MG/1
2.5 TABLET ORAL NIGHTLY
Qty: 31 TABLET | Refills: 2 | Status: SHIPPED | OUTPATIENT
Start: 2024-02-09 | End: 2024-05-12

## 2024-02-09 RX ORDER — TALC
POWDER (GRAM) TOPICAL
Qty: 62 TABLET | Refills: 1 | Status: SHIPPED | OUTPATIENT
Start: 2024-02-09

## 2024-02-09 NOTE — PROGRESS NOTES
ASSESSMENT: Ms. Grant presents is reported to have rapid onset symptoms of dementia.  Staff are in process of obtaining Neurologist diagnosis.  Recommend follow-up with Guardian to see if she would like to restart donepezil until Neurologist can see. This Clinician had Rxed in 2018, until Guardian requested DC.   See treatment plan below.     PLAN:                                                                                                                         problems treated   f/u requested to prevent relapse   medications renewed/re-ordered     1. Continue Olanzapine (Zyprexa) 2.5 mg by mouth at bedtime for psychosis.  2.  Continue melatonin 6 mg by mouth at bedtime for sleep disturbances.  If not effective, may consider trazodone 25 mg by mouth at bedtime next apt.  3. Risks, benefits, alternatives reviewed. Guardian and caregiver perceive the benefits outweigh the risks.   4. Return to clinic in 1 month or earlier if needed. Call [phone] to reschedule.  5.  Please complete: Prescription for EKG given Nov 2023.  Has cardiologist for aortic regurgitation.  Monitoring QTc prolongation related to olanzapine.    Thank you for seeing me today.  If you have any questions or concerns, do not hesitate to contact my office.  Nel Prieto     TREATMENT TYPE                                      counseling and coordination of care, addressing signs and symptoms of illness; risks/benefits and side effects of medications; and behavioral approaches to illness.  This note was created using electronic dictation. There may be errors in syntax and meaning. Please contact the office with any questions.   For [facility] residents, [employer] [facility] is a 24/7 hotline you can call for assistance [[phone]].   Please call 911 or go to your closest Emergency Room if you feel worse. This includes thoughts of hurting yourself or anyone else, or having other troubles such as hearing voices, seeing visions, or having new  and scary thoughts about the people around you.      Provider Impressions     PRESENT FOR APPOINTMENT    Caregiver Alejandra Bowie, known since December 2022  Not present:   Client dt staff had conflict in scheduling (emergency at another home)  Guardian/Sister Mickie Watson     SUBJECTIVE 58 year-old  single female with a history of moderate ID, psychosis, MDD, OCD, and sleep disturbances and PmHx diabetes, GERD, aortic regurgitation, hyperlipidemia, hypertension, hypothyroidism, ventral hernia, vitamin D, vitamin B12 deficiency, Down syndrome, and obesity presenting for medication management.  Had been living in [facility], but moved to assisted living Oct 2018. Still through [facility].      Last seen November 2023.  At that time, melatonin was increased.  January July 2022. At that time, Zyprexa was decreased to find the lowest effective dose.  January 2021. At that time, no medication changes.  November 2021. At that time, Olanzapine (Zyprexa) was restarted.  October 2021. At that time, Melatonin was started & Olanzapine was discontinued (Guardian request as stated never saw symptoms of psychosis).  August 2021. At that time, Discontinue Melatonin    May 2021. At that time, Olanzapine was increased. In interim, July 29, 2021, Sertraline was DC'd by Neuro.   February 2021. At that time, Rexulti was DC'd and Olanzapine was started.  December 2020. At that time, Rexulti was decreased.  August 2020. At that time, Rexulti was increased.  June 2020. At that time, Rexulti was increased.  November 2019. At that time, Rexulti was increased.  September 2019. At that time, Rexulti was started.  July 2019. At that time, no med changes.  April 2019. At that time, no med changes.  November 2018. At that time, Abilify was started, Zoloft and donepezil were increased. All changes were refused by Guardian.  July 30, 2018. At that time, no med changes.   July 2, 2018 for initial evaluation. At that time, Donepezil and  "Zoloft were started.        Memory: Staff report rapid progression of memory loss.  She has forgotten her own name at times and her favored staff.  They are very concerned.  Reviewed chart, where she had been on donepezil prior to meeting this clinician and how donepezil was restarted by this clinician at initial evaluation.  However, due to request of guardian, it was discontinued.  Staff will contact neurologist for official diagnosis.    No longer having screaming episodes.  History: Sleeping well at night. CPAP was DC\"d in Feb 2020 DT not using.   internal stim- \"little friends\" not problematic. No longer pounding reported in her room dt frustrated with voices. Ct reports that the devil upsets her. Has names for these \"friends.\" She does not like when staff try to talk to these friends.   Day Program: ([facility]) [facility]: M, W, F: she enjoys activities and remains social.   Fine hand tremor noted, which had resolved with the restart of Olanzapine. None with decrease to 2.5 mg.     ENT Dr Femi Bob= Biopsy of Thyroid; Bariatric consult- no changes; PCP increased Metformin.   Resided at [facility]- had to watch movies alone in the basement     While on Olanzapine: No longer cowering in her bedroom.   She is is more social, spending time in the shared living space, participating in crafts.   She continues to enjoy reading her books and coloring.     Her sister, Shwetha, takes her home every other weekend. May be for the day or the whole weekend.      HX:  1. boundary issues at prior  ([facility]). Especially with male staff. Her new home has mostly female staff and they have not noted any obsessions or boundary issues.  2. OCD: Has set routines. If her ADL's or activities are interrupted, she will refuse to do anything. When this happened, the staff had to call her sister for help, decreased sleep dt reports of trying to place \"Peter\" the child that she sees and hears in a " "closet.  She is also very methodical/particular in the way she gets dressed/prepared for the day.     MEDICATIONS: Past: Abilify, Aricept, and Amaryl (?= MELLARILL?)  Psy/SI/HI/aggression: +AH that causes her to have head pain.  .   Med Physicians:  PCP: Dr. Micki Matthew  Hematology/Oncology: Dr. Nieves at . referred by Dr. Micki Vences for further evaluation and   management of leukopenia on 02/24/2021.   Neurologist/movement disorders: Dr. Forde at   Cardiologist: Dr. Beth Verdugo= Dr. Kemi Lewis  Dermatologist: Dr. Madhav Oneil  Podiatrist: Dr. Edwin Wong  Dentist: Dr Anna Joshua  GYN: Dr Joanne Dickey at   ENT Dr Femi Bob= Biopsy of Thyroid  Gastro Dr. Carmenza Ruiz  Pulmonologist: Dr. Neely in [facility]  Otolaryngologist: Dr. Tana Cunningham [facility]  Bariatric : Dr. Chas Jett  Urgent Care Jan 2020 for UTI  Urgent Care Feb 2020 for SOB rt allergies   3/13/19 after staff states tried to get her ready for Day Program & \"She was just out of it.\" DX hypo glucose.   Metformin was decreased & Glimepiride (Amaryl) was DC'd.      ALLERGIES: NKDA     FAMILY HISTORY: Lived with mother prior to      Med SE: none reported  COMPLIANCE: good     EPS: mouth dryness, some finger intertwining, but only noted with self talk.  AIMS negative     LABS REVIEWED:  May 2023 in chart  August 2022 in chart  May 2021 in chart  May 2018: reviewed results of CMP, CBC/Diff, GFR, & TSH     EKG    Cardiologist  aortic regurgitation, hypertension  December 2021 in chart QTc 409 MS    History of Present IllnessFamily history: - Psychiatric diagnosis: denies primary relatives with serious mental illness (SA) and/or substance use disorders.   Parents: Mother: Kiera. She lived with her mother and own apartment living before moving into .   Siblings: Sisters: reconnected with in 2018. 2/3 has been talking to weekly.: Shwetha Delgado, States that Kalpesh is not her family.   Personal history: Uncle Toribio, whom " lives out of state, is her POA. Her sister is her Guardian.  Birth: Unknown  Development: Down's Syndrome  Abuse: suspected by staff  Orientation  Marriage/partner: none  Alcohol: unknown   Tobacco: unknown  Drugs: unknown  Prescribed: see below  Recreational: enjoys drawing: had 4 pictures:witches hands, baby devil, witches broom  Past psychiatric: ct states that she has seen before         Initial Fall Risk Screening:   REGULO has not fallen in the last 6 months.    Tobacco Screening: REGULO does not use tobacco.   Diabetes Evaluation:   HbA1c > or = 7%; < 8%.   Blood Pressure monitoring   Blood Pressure Controlled, Systolic <130 mm Hg   Blood Pressure Controlled, Diastolic < 80mm Hg        Review of Systems     Psychiatric: as noted in HPI.       Constitutional: sleep apnea.   Eyes: vision tested , vision impairment and wears glasses/contacts.   ENT: hearing tested, hearing loss, hearing aid  and dental problems.   Cardiovascular: heart defect.   Respiratory: no wheezing.   Gastrointestinal: diarrhea, but no constipation, no abdominal pain, no nausea, no vomiting and no reflux.   Genitourinary: no incontinence.   Musculoskeletal: moving all extremities well and symmetrical.   Integumentary: no rashes.   Neurological: headache and seizures.   Endocrine:. DM.   Hematologic/Lymphatic: no anemia.     Mental Status Exam     Appearance: well-groomed.   Build:. short stature.   Demeanor: average.   Eye Contact: average.   Motor Activity: average.   Speech: slurred.   Language: impoverished language.   Fund of Knowledge: poor fund of knowledge.   Delusions: As noted in HPI.   Self Harm: None Reported.   Aggressive: None Reported.   Mood: euthymic.   Affect: full.   Orientation: alert.   Manner: cooperative.   Thought process: concrete.   Content of thought: As noted in HPI   Abstract/ Rational Thought: mild impairment   Memory: short-term impaired, long-term impaired.   Behavior: normal motor activity.    Attention/Concentration: normal.   Insight: mild impairment.   Judgement: mild impairment.   Musculoskeletal: normal strength and tone.

## 2024-02-09 NOTE — PATIENT INSTRUCTIONS
ASSESSMENT: Ms. Grant presents is reported to have rapid onset symptoms of dementia.  Staff are in process of obtaining Neurologist diagnosis.  Recommend follow-up with Guardian to see if she would like to restart donepezil until Neurologist can see. This Clinician had Rxed in 2018, until Guardian requested DC.   See treatment plan below.     PLAN:                                                                                                                         problems treated   f/u requested to prevent relapse   medications renewed/re-ordered     1. Continue Olanzapine (Zyprexa) 2.5 mg by mouth at bedtime for psychosis.  2.  Continue melatonin 6 mg by mouth at bedtime for sleep disturbances.  If not effective, may consider trazodone 25 mg by mouth at bedtime next apt.  3. Risks, benefits, alternatives reviewed. Guardian and caregiver perceive the benefits outweigh the risks.   4. Return to clinic in 1 month or earlier if needed. Call [phone] to reschedule.  5.  Please complete: Prescription for EKG given Nov 2023.  Has cardiologist for aortic regurgitation.  Monitoring QTc prolongation related to olanzapine.    Thank you for seeing me today.  If you have any questions or concerns, do not hesitate to contact my office.  Nel Prieto     TREATMENT TYPE                                      counseling and coordination of care, addressing signs and symptoms of illness; risks/benefits and side effects of medications; and behavioral approaches to illness.  This note was created using electronic dictation. There may be errors in syntax and meaning. Please contact the office with any questions.   For [facility] residents, [employer] is a 24/7 hotline you can call for assistance [[phone]].   Please call 911 or go to your closest Emergency Room if you feel worse. This includes thoughts of hurting yourself or anyone else, or having other troubles such as hearing voices, seeing visions, or having new  and scary thoughts about the people around you.

## 2024-02-16 ENCOUNTER — OFFICE VISIT (OUTPATIENT)
Dept: PRIMARY CARE | Facility: CLINIC | Age: 59
End: 2024-02-16
Payer: MEDICARE

## 2024-02-16 VITALS
HEIGHT: 57 IN | TEMPERATURE: 97.5 F | OXYGEN SATURATION: 97 % | SYSTOLIC BLOOD PRESSURE: 122 MMHG | DIASTOLIC BLOOD PRESSURE: 88 MMHG | WEIGHT: 131.4 LBS | BODY MASS INDEX: 28.35 KG/M2 | RESPIRATION RATE: 16 BRPM | HEART RATE: 63 BPM

## 2024-02-16 DIAGNOSIS — E11.69 TYPE 2 DIABETES MELLITUS WITH OTHER SPECIFIED COMPLICATION, WITHOUT LONG-TERM CURRENT USE OF INSULIN (MULTI): ICD-10-CM

## 2024-02-16 DIAGNOSIS — Q90.9 TRISOMY 21, DOWN SYNDROME (HHS-HCC): ICD-10-CM

## 2024-02-16 DIAGNOSIS — R63.4 WEIGHT LOSS, UNINTENTIONAL: ICD-10-CM

## 2024-02-16 DIAGNOSIS — E03.9 HYPOTHYROIDISM, UNSPECIFIED TYPE: ICD-10-CM

## 2024-02-16 DIAGNOSIS — Z00.00 ROUTINE GENERAL MEDICAL EXAMINATION AT HEALTH CARE FACILITY: Primary | ICD-10-CM

## 2024-02-16 DIAGNOSIS — R41.3 MEMORY LOSS: ICD-10-CM

## 2024-02-16 DIAGNOSIS — E11.65 TYPE 2 DIABETES MELLITUS WITH HYPERGLYCEMIA, WITHOUT LONG-TERM CURRENT USE OF INSULIN (MULTI): ICD-10-CM

## 2024-02-16 LAB — POC HEMOGLOBIN A1C: 6.4 % (ref 4.2–6.5)

## 2024-02-16 PROCEDURE — 1036F TOBACCO NON-USER: CPT | Performed by: INTERNAL MEDICINE

## 2024-02-16 PROCEDURE — 99214 OFFICE O/P EST MOD 30 MIN: CPT | Performed by: INTERNAL MEDICINE

## 2024-02-16 PROCEDURE — 3074F SYST BP LT 130 MM HG: CPT | Performed by: INTERNAL MEDICINE

## 2024-02-16 PROCEDURE — 83036 HEMOGLOBIN GLYCOSYLATED A1C: CPT | Performed by: INTERNAL MEDICINE

## 2024-02-16 PROCEDURE — G0439 PPPS, SUBSEQ VISIT: HCPCS | Performed by: INTERNAL MEDICINE

## 2024-02-16 PROCEDURE — 3079F DIAST BP 80-89 MM HG: CPT | Performed by: INTERNAL MEDICINE

## 2024-02-16 ASSESSMENT — ACTIVITIES OF DAILY LIVING (ADL)
TAKING_MEDICATION: TOTAL CARE
DOING_HOUSEWORK: TOTAL CARE
GROCERY_SHOPPING: TOTAL CARE
MANAGING_FINANCES: TOTAL CARE
BATHING: INDEPENDENT
DRESSING: INDEPENDENT

## 2024-02-16 ASSESSMENT — PATIENT HEALTH QUESTIONNAIRE - PHQ9
2. FEELING DOWN, DEPRESSED OR HOPELESS: NOT AT ALL
SUM OF ALL RESPONSES TO PHQ9 QUESTIONS 1 AND 2: 0
1. LITTLE INTEREST OR PLEASURE IN DOING THINGS: NOT AT ALL

## 2024-02-16 NOTE — PROGRESS NOTES
"Subjective   Patient ID: Ene Grant is a 58 y.o. female who presents for Medicare Annual Wellness Visit Subsequent, Follow-up (4 month and A1C check), and Dementia (Discuss progressing dementia).    HPI   Here with transporter  Concern for worsening memory  Maira states she is good  Maira states has been trying to lose weight  She is down 14 #  Still has not do labs  Review of Systems   Unable to perform ROS: Other   All other systems reviewed and are negative.  Maira is poor historian and denies everything    Objective   /88   Pulse 63   Temp 36.4 °C (97.5 °F)   Resp 16   Ht 1.454 m (4' 9.25\")   Wt 59.6 kg (131 lb 6.4 oz)   SpO2 97%   BMI 28.19 kg/m²     Physical Exam  Constitutional:       Appearance: Normal appearance.      Comments: Down syndrome faces   HENT:      Mouth/Throat:      Mouth: Mucous membranes are dry.      Comments: Macroglossia   Eyes:      Extraocular Movements: Extraocular movements intact.      Pupils: Pupils are equal, round, and reactive to light.   Cardiovascular:      Rate and Rhythm: Normal rate and regular rhythm.      Heart sounds: Normal heart sounds. No murmur heard.     No gallop.   Pulmonary:      Effort: Pulmonary effort is normal. No respiratory distress.      Breath sounds: Normal breath sounds.   Abdominal:      General: There is no distension.      Palpations: Abdomen is soft. There is no mass.      Tenderness: There is no abdominal tenderness.   Musculoskeletal:      Left lower leg: No edema.   Neurological:      Mental Status: She is alert.         Assessment/Plan   Problem List Items Addressed This Visit             ICD-10-CM    Diabetes mellitus (CMS/HCC) E11.9    Relevant Orders    POCT glycosylated hemoglobin (Hb A1C) manually resulted (Completed)    Hypothyroidism E03.9    Trisomy 21, Down syndrome Q90.9    Type 2 diabetes mellitus with other specified complication, without long-term current use of insulin (CMS/HCC) E11.69     Other Visit Diagnoses       "   Codes    Routine general medical examination at health care facility    -  Primary Z00.00    Relevant Orders    1 Year Follow Up In [facility] - PCP - Wellness Exam    Memory loss     R41.3          MCW done    Weight loss-check labs  -if continues- may need CT C/A/P     Ventral hernia: saw surgery for this     Tremor: med induced, saw neuro, from zyprexa/rexulti  -resolved    Memory changes- saw neuro  -has labs ordered- advised to get done ASAP  -if normal-should have imaging     Thyroid nodule: s/p left sided thyroidectomy  -healing well  -TSH WNL 12/22 at ENT at [facility]     Type 2 diabetes: 12/20: 6.0--> 6.8--> 7.1--> 6.8--> 7.7--> 7.6--> 6.6--> 6.1--> 6.4  -Higher doses of metformin cause diarrhea  -On metformin and Jardiance  -saw endo and they agree with our plan (family wanted her to see)  -f/u 3 months     Biscupid aortic valve with AS: seeing Dr. Lewis  -asymptomatic     Leukopenia: saw heme and med induced  -monitor     Hyperlipidemia: on simvastatin and fenofibrate  -8/22 controlled     Hypothyroidism: on synthroid  -12/22 controlled, getting checked with ENT--has not been checked     Behavioral issues: sees psych  -on zoloft and rexulti     Down syndrome: lives at Group Home     Vitamin b12/D deficiency: WNL 12/20     Headache: prn tylenol  GERD: on PPI     Allergies: on zyrtec and flonase     Return 3 months for a1c and labs now. Stressed importance of followup  Eye doc; Dr. Gonzalez  GI: Dr. Ruiz ([facility])  Derm: Dr. Oneil  ENT-- Dr. YESSICA Bob     Health Maintenance  -Pap smear: sees gyn  -Vaccinations: flu shot UTD. Pneumovax booster, shingrix, and tdap (2012). Advised tdap  -Mammogram: 1/24---neg, given order for August  -Colonoscopy: 11/9/17-normal, repeat 10 years (Dr. Ruiz)-records received  -DEXA: at 66

## 2024-02-16 NOTE — PATIENT INSTRUCTIONS
Please call with date of last pneumonia vaccine. If has not had, she will need.    She is due for labs. Please do ASAP. This is fasting    A1c is 6.4 which is great!    Maira has lost 14 pounds since I last saw her. I want to track her weight. If continues to drop, I need to know about it.    Followup 3 months

## 2024-02-19 ENCOUNTER — APPOINTMENT (OUTPATIENT)
Dept: CARDIOLOGY | Facility: CLINIC | Age: 59
End: 2024-02-19
Payer: MEDICARE

## 2024-02-19 ENCOUNTER — TELEPHONE (OUTPATIENT)
Dept: PRIMARY CARE | Facility: CLINIC | Age: 59
End: 2024-02-19
Payer: MEDICARE

## 2024-02-19 RX ORDER — EMOLLIENT BASE
CREAM (GRAM) TOPICAL
COMMUNITY
Start: 2024-02-09

## 2024-02-19 NOTE — TELEPHONE ENCOUNTER
[facility] called and states that in their records pt had a pneumonia vaccine in 2021.    I looked in old records and pt have it.    EPIC updated.    Please update/addend your OV.

## 2024-02-20 ENCOUNTER — APPOINTMENT (OUTPATIENT)
Dept: RADIOLOGY | Facility: CLINIC | Age: 59
End: 2024-02-20
Payer: COMMERCIAL

## 2024-02-20 DIAGNOSIS — Z12.31 SCREENING MAMMOGRAM FOR BREAST CANCER: ICD-10-CM

## 2024-02-27 ENCOUNTER — LAB (OUTPATIENT)
Dept: LAB | Facility: LAB | Age: 59
End: 2024-02-27
Payer: MEDICARE

## 2024-02-27 DIAGNOSIS — D72.819 CHRONIC LEUKOPENIA: ICD-10-CM

## 2024-02-27 DIAGNOSIS — R41.3 MEMORY LOSS OF UNKNOWN CAUSE: ICD-10-CM

## 2024-02-27 DIAGNOSIS — E11.65 TYPE 2 DIABETES MELLITUS WITH HYPERGLYCEMIA, WITHOUT LONG-TERM CURRENT USE OF INSULIN (MULTI): ICD-10-CM

## 2024-02-27 DIAGNOSIS — E55.9 VITAMIN D DEFICIENCY: ICD-10-CM

## 2024-02-27 DIAGNOSIS — R41.89 COGNITIVE IMPAIRMENT: ICD-10-CM

## 2024-02-27 LAB
25(OH)D3 SERPL-MCNC: 92 NG/ML (ref 30–100)
ALBUMIN SERPL BCP-MCNC: 3.9 G/DL (ref 3.4–5)
ALP SERPL-CCNC: 39 U/L (ref 33–110)
ALT SERPL W P-5'-P-CCNC: 13 U/L (ref 7–45)
ANION GAP SERPL CALC-SCNC: 9 MMOL/L (ref 10–20)
AST SERPL W P-5'-P-CCNC: 24 U/L (ref 9–39)
BILIRUB SERPL-MCNC: 0.5 MG/DL (ref 0–1.2)
BUN SERPL-MCNC: 23 MG/DL (ref 6–23)
CALCIUM SERPL-MCNC: 9 MG/DL (ref 8.6–10.3)
CHLORIDE SERPL-SCNC: 105 MMOL/L (ref 98–107)
CHOLEST SERPL-MCNC: 152 MG/DL (ref 0–199)
CHOLESTEROL/HDL RATIO: 3.1
CO2 SERPL-SCNC: 30 MMOL/L (ref 21–32)
CREAT SERPL-MCNC: 1.08 MG/DL (ref 0.5–1.05)
CREAT UR-MCNC: 72.9 MG/DL (ref 20–320)
EGFRCR SERPLBLD CKD-EPI 2021: 60 ML/MIN/1.73M*2
ERYTHROCYTE [DISTWIDTH] IN BLOOD BY AUTOMATED COUNT: 13.2 % (ref 11.5–14.5)
EST. AVERAGE GLUCOSE BLD GHB EST-MCNC: 131 MG/DL
GLUCOSE SERPL-MCNC: 129 MG/DL (ref 74–99)
HBA1C MFR BLD: 6.2 %
HCT VFR BLD AUTO: 48 % (ref 36–46)
HDLC SERPL-MCNC: 49.5 MG/DL
HGB BLD-MCNC: 15.7 G/DL (ref 12–16)
LDLC SERPL CALC-MCNC: 81 MG/DL
MCH RBC QN AUTO: 34.4 PG (ref 26–34)
MCHC RBC AUTO-ENTMCNC: 32.7 G/DL (ref 32–36)
MCV RBC AUTO: 105 FL (ref 80–100)
MICROALBUMIN UR-MCNC: <7 MG/L
MICROALBUMIN/CREAT UR: NORMAL MG/G{CREAT}
NON HDL CHOLESTEROL: 103 MG/DL (ref 0–149)
NRBC BLD-RTO: 0 /100 WBCS (ref 0–0)
PLATELET # BLD AUTO: 301 X10*3/UL (ref 150–450)
POTASSIUM SERPL-SCNC: 4.3 MMOL/L (ref 3.5–5.3)
PROT SERPL-MCNC: 6.5 G/DL (ref 6.4–8.2)
RBC # BLD AUTO: 4.56 X10*6/UL (ref 4–5.2)
SODIUM SERPL-SCNC: 140 MMOL/L (ref 136–145)
T4 FREE SERPL-MCNC: 0.88 NG/DL (ref 0.61–1.12)
TRIGL SERPL-MCNC: 110 MG/DL (ref 0–149)
TSH SERPL-ACNC: 6.46 MIU/L (ref 0.44–3.98)
VIT B12 SERPL-MCNC: 625 PG/ML (ref 211–911)
VLDL: 22 MG/DL (ref 0–40)
WBC # BLD AUTO: 2.6 X10*3/UL (ref 4.4–11.3)

## 2024-02-27 PROCEDURE — 83036 HEMOGLOBIN GLYCOSYLATED A1C: CPT

## 2024-02-27 PROCEDURE — 84443 ASSAY THYROID STIM HORMONE: CPT

## 2024-02-27 PROCEDURE — 36415 COLL VENOUS BLD VENIPUNCTURE: CPT

## 2024-02-27 PROCEDURE — 85027 COMPLETE CBC AUTOMATED: CPT

## 2024-02-27 PROCEDURE — 82043 UR ALBUMIN QUANTITATIVE: CPT

## 2024-02-27 PROCEDURE — 84439 ASSAY OF FREE THYROXINE: CPT

## 2024-02-27 PROCEDURE — 85007 BL SMEAR W/DIFF WBC COUNT: CPT

## 2024-02-27 PROCEDURE — 80061 LIPID PANEL: CPT

## 2024-02-27 PROCEDURE — 82607 VITAMIN B-12: CPT

## 2024-02-27 PROCEDURE — 82570 ASSAY OF URINE CREATININE: CPT

## 2024-02-27 PROCEDURE — 82306 VITAMIN D 25 HYDROXY: CPT

## 2024-02-27 PROCEDURE — 80053 COMPREHEN METABOLIC PANEL: CPT

## 2024-02-28 ENCOUNTER — TELEPHONE (OUTPATIENT)
Dept: PRIMARY CARE | Facility: CLINIC | Age: 59
End: 2024-02-28
Payer: MEDICARE

## 2024-02-28 DIAGNOSIS — D72.819 CHRONIC LEUKOPENIA: Primary | ICD-10-CM

## 2024-02-28 DIAGNOSIS — R46.89 CHANGE IN BEHAVIOR: ICD-10-CM

## 2024-02-28 DIAGNOSIS — R41.3 MEMORY LOSS: ICD-10-CM

## 2024-02-28 DIAGNOSIS — E03.9 HYPOTHYROIDISM, UNSPECIFIED TYPE: ICD-10-CM

## 2024-02-28 DIAGNOSIS — E11.65 TYPE 2 DIABETES MELLITUS WITH HYPERGLYCEMIA, WITHOUT LONG-TERM CURRENT USE OF INSULIN (MULTI): Primary | ICD-10-CM

## 2024-02-28 DIAGNOSIS — R63.4 WEIGHT LOSS, UNINTENTIONAL: ICD-10-CM

## 2024-02-28 LAB
BASOPHILS # BLD MANUAL: 0.05 X10*3/UL (ref 0–0.1)
BASOPHILS NFR BLD MANUAL: 2 %
EOSINOPHIL # BLD MANUAL: 0.08 X10*3/UL (ref 0–0.7)
EOSINOPHIL NFR BLD MANUAL: 3 %
ERYTHROCYTE [DISTWIDTH] IN BLOOD BY AUTOMATED COUNT: 13.2 % (ref 11.5–14.5)
HCT VFR BLD AUTO: 48 % (ref 36–46)
HGB BLD-MCNC: 15.7 G/DL (ref 12–16)
IMM GRANULOCYTES # BLD AUTO: 0.01 X10*3/UL (ref 0–0.7)
IMM GRANULOCYTES NFR BLD AUTO: 0.4 % (ref 0–0.9)
LYMPHOCYTES # BLD MANUAL: 1.22 X10*3/UL (ref 1.2–4.8)
LYMPHOCYTES NFR BLD MANUAL: 47 %
MCH RBC QN AUTO: 34.4 PG (ref 26–34)
MCHC RBC AUTO-ENTMCNC: 32.7 G/DL (ref 32–36)
MCV RBC AUTO: 105 FL (ref 80–100)
MONOCYTES # BLD MANUAL: 0.36 X10*3/UL (ref 0.1–1)
MONOCYTES NFR BLD MANUAL: 14 %
NEUTROPHILS # BLD MANUAL: 0.89 X10*3/UL (ref 1.2–7.7)
NEUTS BAND # BLD MANUAL: 0.34 X10*3/UL (ref 0–0.7)
NEUTS BAND NFR BLD MANUAL: 13 %
NEUTS SEG # BLD MANUAL: 0.55 X10*3/UL (ref 1.2–7)
NEUTS SEG NFR BLD MANUAL: 21 %
NRBC BLD-RTO: 0 /100 WBCS (ref 0–0)
PLATELET # BLD AUTO: 301 X10*3/UL (ref 150–450)
RBC # BLD AUTO: 4.56 X10*6/UL (ref 4–5.2)
RBC MORPH BLD: ABNORMAL
TOTAL CELLS COUNTED BLD: 100
WBC # BLD AUTO: 2.6 X10*3/UL (ref 4.4–11.3)

## 2024-02-28 RX ORDER — LEVOTHYROXINE SODIUM 75 UG/1
75 TABLET ORAL
Qty: 30 TABLET | Refills: 1 | Status: SHIPPED | OUTPATIENT
Start: 2024-02-28 | End: 2024-04-11

## 2024-02-28 NOTE — TELEPHONE ENCOUNTER
----- Message from Micki Matthew MD sent at 2/28/2024 10:27 AM EST -----  Will you let [facility] know labs overall ok but shows thyroid dose needs increased and a little dehydrated so increase fluid. I am increasing thyroid dose to 75 mcg and want a repeat in 6 weeks. I am also ordering an MRI of her brain to get checked.

## 2024-03-12 ENCOUNTER — LAB (OUTPATIENT)
Dept: LAB | Facility: LAB | Age: 59
End: 2024-03-12
Payer: MEDICARE

## 2024-03-12 DIAGNOSIS — R39.9 UTI SYMPTOMS: ICD-10-CM

## 2024-03-12 LAB
APPEARANCE UR: ABNORMAL
BILIRUB UR STRIP.AUTO-MCNC: NEGATIVE MG/DL
COLOR UR: YELLOW
GLUCOSE UR STRIP.AUTO-MCNC: ABNORMAL MG/DL
KETONES UR STRIP.AUTO-MCNC: NEGATIVE MG/DL
LEUKOCYTE ESTERASE UR QL STRIP.AUTO: NEGATIVE
NITRITE UR QL STRIP.AUTO: NEGATIVE
PH UR STRIP.AUTO: 5 [PH]
PROT UR STRIP.AUTO-MCNC: NEGATIVE MG/DL
RBC # UR STRIP.AUTO: NEGATIVE /UL
SP GR UR STRIP.AUTO: 1.01
UROBILINOGEN UR STRIP.AUTO-MCNC: <2 MG/DL

## 2024-03-12 PROCEDURE — 81003 URINALYSIS AUTO W/O SCOPE: CPT

## 2024-03-13 DIAGNOSIS — E11.65 TYPE 2 DIABETES MELLITUS WITH HYPERGLYCEMIA, WITHOUT LONG-TERM CURRENT USE OF INSULIN (MULTI): ICD-10-CM

## 2024-03-13 RX ORDER — METFORMIN HYDROCHLORIDE 500 MG/1
500 TABLET, EXTENDED RELEASE ORAL 2 TIMES DAILY
Qty: 60 TABLET | Refills: 10 | Status: SHIPPED | OUTPATIENT
Start: 2024-03-13

## 2024-03-26 ENCOUNTER — APPOINTMENT (OUTPATIENT)
Dept: RADIOLOGY | Facility: CLINIC | Age: 59
End: 2024-03-26
Payer: MEDICARE

## 2024-03-27 ENCOUNTER — APPOINTMENT (OUTPATIENT)
Dept: RADIOLOGY | Facility: CLINIC | Age: 59
End: 2024-03-27
Payer: MEDICARE

## 2024-03-27 ENCOUNTER — HOSPITAL ENCOUNTER (OUTPATIENT)
Dept: RADIOLOGY | Facility: CLINIC | Age: 59
Discharge: HOME | End: 2024-03-27
Payer: MEDICARE

## 2024-03-27 DIAGNOSIS — R46.89 CHANGE IN BEHAVIOR: ICD-10-CM

## 2024-03-27 DIAGNOSIS — R41.3 MEMORY LOSS: ICD-10-CM

## 2024-03-27 DIAGNOSIS — R63.4 WEIGHT LOSS, UNINTENTIONAL: ICD-10-CM

## 2024-03-27 PROCEDURE — 70551 MRI BRAIN STEM W/O DYE: CPT | Performed by: RADIOLOGY

## 2024-03-27 PROCEDURE — 70551 MRI BRAIN STEM W/O DYE: CPT

## 2024-04-03 ENCOUNTER — TELEPHONE (OUTPATIENT)
Dept: PRIMARY CARE | Facility: CLINIC | Age: 59
End: 2024-04-03
Payer: MEDICARE

## 2024-04-03 NOTE — TELEPHONE ENCOUNTER
----- Message from Rohith Schneider CMA sent at 4/3/2024 11:08 AM EDT -----  [facility] aware of results and they will follow up with neurology

## 2024-04-08 ENCOUNTER — APPOINTMENT (OUTPATIENT)
Dept: CARDIOLOGY | Facility: CLINIC | Age: 59
End: 2024-04-08
Payer: MEDICARE

## 2024-04-11 DIAGNOSIS — E03.9 HYPOTHYROIDISM, UNSPECIFIED TYPE: ICD-10-CM

## 2024-04-11 RX ORDER — LEVOTHYROXINE SODIUM 75 UG/1
TABLET ORAL
Qty: 90 TABLET | Refills: 3 | Status: SHIPPED | OUTPATIENT
Start: 2024-04-11

## 2024-04-12 DIAGNOSIS — E11.69 TYPE 2 DIABETES MELLITUS WITH OTHER SPECIFIED COMPLICATION, WITHOUT LONG-TERM CURRENT USE OF INSULIN (MULTI): ICD-10-CM

## 2024-04-12 DIAGNOSIS — E11.65 TYPE 2 DIABETES MELLITUS WITH HYPERGLYCEMIA, WITHOUT LONG-TERM CURRENT USE OF INSULIN (MULTI): ICD-10-CM

## 2024-04-12 DIAGNOSIS — Z00.00 ROUTINE GENERAL MEDICAL EXAMINATION AT A HEALTH CARE FACILITY: ICD-10-CM

## 2024-04-12 RX ORDER — BLOOD SUGAR DIAGNOSTIC
STRIP MISCELLANEOUS
Qty: 100 STRIP | Refills: 2 | Status: SHIPPED | OUTPATIENT
Start: 2024-04-12 | End: 2024-05-01 | Stop reason: SDUPTHER

## 2024-04-12 RX ORDER — LANCETS
EACH MISCELLANEOUS
Qty: 100 EACH | Refills: 0 | Status: SHIPPED | OUTPATIENT
Start: 2024-04-12 | End: 2024-05-01 | Stop reason: SDUPTHER

## 2024-04-16 DIAGNOSIS — E11.69 TYPE 2 DIABETES MELLITUS WITH OTHER SPECIFIED COMPLICATION, WITHOUT LONG-TERM CURRENT USE OF INSULIN (MULTI): ICD-10-CM

## 2024-04-16 DIAGNOSIS — Z00.00 ROUTINE GENERAL MEDICAL EXAMINATION AT A HEALTH CARE FACILITY: ICD-10-CM

## 2024-04-30 ENCOUNTER — OFFICE VISIT (OUTPATIENT)
Dept: CARDIOLOGY | Facility: CLINIC | Age: 59
End: 2024-04-30
Payer: MEDICARE

## 2024-04-30 VITALS
HEART RATE: 70 BPM | WEIGHT: 128 LBS | DIASTOLIC BLOOD PRESSURE: 71 MMHG | OXYGEN SATURATION: 97 % | SYSTOLIC BLOOD PRESSURE: 107 MMHG | BODY MASS INDEX: 27.46 KG/M2

## 2024-04-30 DIAGNOSIS — Q23.1 BICUSPID AORTIC VALVE (HHS-HCC): ICD-10-CM

## 2024-04-30 DIAGNOSIS — E78.5 DYSLIPIDEMIA: Primary | ICD-10-CM

## 2024-04-30 PROCEDURE — 1036F TOBACCO NON-USER: CPT | Performed by: INTERNAL MEDICINE

## 2024-04-30 PROCEDURE — 3078F DIAST BP <80 MM HG: CPT | Performed by: INTERNAL MEDICINE

## 2024-04-30 PROCEDURE — 3048F LDL-C <100 MG/DL: CPT | Performed by: INTERNAL MEDICINE

## 2024-04-30 PROCEDURE — 93010 ELECTROCARDIOGRAM REPORT: CPT | Performed by: INTERNAL MEDICINE

## 2024-04-30 PROCEDURE — 93005 ELECTROCARDIOGRAM TRACING: CPT | Performed by: INTERNAL MEDICINE

## 2024-04-30 PROCEDURE — 3044F HG A1C LEVEL LT 7.0%: CPT | Performed by: INTERNAL MEDICINE

## 2024-04-30 PROCEDURE — 3062F POS MACROALBUMINURIA REV: CPT | Performed by: INTERNAL MEDICINE

## 2024-04-30 PROCEDURE — 99214 OFFICE O/P EST MOD 30 MIN: CPT | Performed by: INTERNAL MEDICINE

## 2024-04-30 PROCEDURE — 3074F SYST BP LT 130 MM HG: CPT | Performed by: INTERNAL MEDICINE

## 2024-04-30 ASSESSMENT — PAIN SCALES - GENERAL: PAINLEVEL: 0-NO PAIN

## 2024-04-30 ASSESSMENT — ENCOUNTER SYMPTOMS
OCCASIONAL FEELINGS OF UNSTEADINESS: 0
DEPRESSION: 0
LOSS OF SENSATION IN FEET: 0

## 2024-04-30 NOTE — PATIENT INSTRUCTIONS
Dear Ene,     Thank you for seeing us in the clinic today. Today, we discussed the followin) We will stop your Aspirin as we currently do not anticipate that you need it.     2) We will schedule an ECHO for your heart, which is a jelly scan. This was done in  but an updated ECHO will give us a better picture of your heart.     3) Please return to the clinic in 6 months, or earlier, if you develop any symptoms.     Kind regards,   Frank Grimaldo MD  Internal Medicine/Cardiology  [facility]

## 2024-04-30 NOTE — LETTER
Dear Ene,     Thank you for seeing us in the clinic today. Today, we discussed the followin) We will stop your Aspirin as we currently do not anticipate that you need it.     2) We will schedule an ECHO for your heart, which is a jelly scan. This was done in  but an updated ECHO will give us a better picture of your heart.     3) Please return to the clinic in 6 months, or earlier, if you develop any symptoms.     Kind regards,   Frank Grimaldo MD  Internal Medicine/Cardiology  [facility]

## 2024-04-30 NOTE — PROGRESS NOTES
Subjective   Patient ID: Ene Grant is a 58 y.o. female who presents for No chief complaint on file..    In the clinic today, she reports no problems and does not endorse any chest pain, SOB, syncope, or dizziness.     HPI   Maira Grant is a 57 yo F with hx as listed below who returns to Cardiology Clinic for bicuspid aortic valve; last seen by Dr. Joshua nick in 2021. Maira is a limited historian as has Down's syndrome with dementia. She lives in a group home and is accompanied by  today. Maira feels well and has no complaints. Denies CP, SOB, dizziness, syncope, or LE edema. Denies syncope, fainting, or lethargy. Per the caregiver, the patient is in her usual state of health and has not endorsed any pain, dizziness, or SOB lately.  TTE in interim with moderate AR and moderate AS (see below). ECG today shows sinus bradycardia.    PAST MEDICAL/SURGICAL HISTORY:  -bicuspid aortic valve with moderate AS and AI  -GERD  -DM  -depression/anxiety  -DLD with high TG (as high as 538 in past)  -vitamin B12 deficiency  -Down's syndrome with dementia  -hypothyroidism  -CHUY  -umbilical hernia  -leukopenia     PRIOR CARDIAC TESTING:  -TTE (2021): EF 60-65%, impaired relaxation, moderate AI, bicuspid aortic valve (peak/mean gradients 38/17), mild MAC, moderate AS   -TTE (2020): EF 65%, impaired relaxation, bicuspid aortic valve with mild AS (37/19), mild AI  -TTE (2007): EF 60-65%, aortic valve likely bicuspid with mild AI and mild AS (24/11)    Review of Systems  Constitutional: not feeling tired.   Eyes: no eyesight problems.   ENT: no hearing loss and no nosebleeds.   Cardiovascular: no intermittent leg claudication, no chest pain, no tightness or heavy pressure, no shortness of breath, no palpitations, no lower extremity edema and as noted in HPI.   Respiratory: no chronic cough and no shortness of breath.   Gastrointestinal: no change in bowel habits and no blood in stools.   Genitourinary: no urinary  frequency.   Musculoskeletal: no arthralgias and no myalgias.   Skin: no skin rashes.   Neurological: no seizures, no frequent falls, no dizziness and no fainting.   Psychiatric: no depression and not suicidal.   Endocrine: thyroid disorder and diabetes mellitus.   All other systems have been reviewed and are negative for complaint.   Objective   /71   Pulse 70   Wt 58.1 kg (128 lb)   SpO2 97%   BMI 27.46 kg/m²     Physical Exam  Constitutional: alert and in no acute distress, NOT oriented to place, person or time.  HEENT: neck is supple, symmetric, trachea midline, no masses .   Abdomen: normal bowel sounds, soft, non-tender  : Mild suprapubic distension  Pulmonary: no increased work of breathing or signs of respiratory distress  and lungs clear to auscultation.    Extremities:  Warm, no edema, well perfused  Cardiovascular:  and no edema  . AS murmur auscultated.  Neuro: judgment and insight is poor, she is not oriented to person, place and time; mood and affect appears to be normal.     Assessment/Plan   57 y/o Female F with hx of DM, DLD with high TG, bicuspid aortic valve with moderate AS/AI, and Down's syndrome with dementia here for follow-up visit. She was seen virtually in 2021 during the pandemic but has not been seen since.  Patient lives in group home and is asymptomatic. Notably, she has a bicuspid aortic valve with last TTE showing the findings above. CT chest in 2014 without significant aortic dilatation. Today, she reports no symptom and remains asymptomatic     #Bicuspid aortic valve  #AIAR  - Last TTE (2021): EF 60-65%, impaired relaxation, moderate AI, bicuspid aortic valve (peak/mean gradients 38/17), mild MAC, moderate AS   - Repeat TTE to ensure there is no aortopathy, which remains a potential concern in this case. R/o progression of her disease.     #Primary prevention   - The patient is currently on Aspirin 81mg for primary prevention  - Will stop it at this time as the mild  benefit at this time does not outweigh the bleeding risks if the patient were to sustain a fall given her health status.     RTC in 6 months or earlier if ECHO shows progression of her disease.     Frank Grimaldo MD  Internal Medicine  [facility]

## 2024-05-01 ENCOUNTER — TELEPHONE (OUTPATIENT)
Dept: PRIMARY CARE | Facility: CLINIC | Age: 59
End: 2024-05-01
Payer: MEDICARE

## 2024-05-01 DIAGNOSIS — E11.69 TYPE 2 DIABETES MELLITUS WITH OTHER SPECIFIED COMPLICATION, WITHOUT LONG-TERM CURRENT USE OF INSULIN (MULTI): ICD-10-CM

## 2024-05-01 DIAGNOSIS — Z00.00 ROUTINE GENERAL MEDICAL EXAMINATION AT A HEALTH CARE FACILITY: ICD-10-CM

## 2024-05-01 DIAGNOSIS — E11.65 TYPE 2 DIABETES MELLITUS WITH HYPERGLYCEMIA, WITHOUT LONG-TERM CURRENT USE OF INSULIN (MULTI): ICD-10-CM

## 2024-05-01 RX ORDER — BLOOD SUGAR DIAGNOSTIC
STRIP MISCELLANEOUS
Qty: 100 STRIP | Refills: 2 | Status: SHIPPED | OUTPATIENT
Start: 2024-05-01

## 2024-05-01 RX ORDER — LANCETS
EACH MISCELLANEOUS
Qty: 100 EACH | Refills: 0 | Status: SHIPPED | OUTPATIENT
Start: 2024-05-01

## 2024-05-01 NOTE — TELEPHONE ENCOUNTER
Melanie from [facility] called to get message to Dr. Matthew:    Mary stated she need chart notes for past 12 months for medicare in order to bill test strips and lancets.  She stated she sent a request from on 4/12/24 but haven't received anything.    Mary [phone]  Fax [phone]

## 2024-05-04 DIAGNOSIS — T78.40XS ALLERGY, SEQUELA: ICD-10-CM

## 2024-05-06 RX ORDER — FLUTICASONE PROPIONATE 50 MCG
SPRAY, SUSPENSION (ML) NASAL
Qty: 9.9 ML | Refills: 10 | Status: SHIPPED | OUTPATIENT
Start: 2024-05-06

## 2024-05-09 LAB
ATRIAL RATE: 59 BPM
P AXIS: 7 DEGREES
P OFFSET: 173 MS
P ONSET: 125 MS
PR INTERVAL: 190 MS
Q ONSET: 220 MS
QRS COUNT: 10 BEATS
QRS DURATION: 96 MS
QT INTERVAL: 402 MS
QTC CALCULATION(BAZETT): 397 MS
QTC FREDERICIA: 399 MS
R AXIS: -4 DEGREES
T AXIS: 34 DEGREES
T OFFSET: 421 MS
VENTRICULAR RATE: 59 BPM

## 2024-05-30 ENCOUNTER — APPOINTMENT (OUTPATIENT)
Dept: PRIMARY CARE | Facility: CLINIC | Age: 59
End: 2024-05-30
Payer: MEDICARE

## 2024-06-04 ENCOUNTER — HOSPITAL ENCOUNTER (OUTPATIENT)
Dept: CARDIOLOGY | Facility: CLINIC | Age: 59
Discharge: HOME | End: 2024-06-04
Payer: MEDICARE

## 2024-06-04 DIAGNOSIS — Q23.1 BICUSPID AORTIC VALVE (HHS-HCC): ICD-10-CM

## 2024-06-04 LAB
AORTIC VALVE MEAN GRADIENT: 9.5 MMHG
AORTIC VALVE PEAK VELOCITY: 2.5 M/S
AV PEAK GRADIENT: 25 MMHG
AVA (PEAK VEL): 1.43 CM2
AVA (VTI): 1.56 CM2
EJECTION FRACTION APICAL 4 CHAMBER: 64.7
LEFT VENTRICLE INTERNAL DIMENSION DIASTOLE: 4.34 CM (ref 3.5–6)
LEFT VENTRICULAR OUTFLOW TRACT DIAMETER: 1.7 CM
LV EJECTION FRACTION BIPLANE: 66 %
MITRAL VALVE E/A RATIO: 0.91
MITRAL VALVE E/E' RATIO: 14.37
RIGHT VENTRICLE FREE WALL PEAK S': 10 CM/S
TRICUSPID ANNULAR PLANE SYSTOLIC EXCURSION: 1.9 CM

## 2024-06-04 PROCEDURE — 93306 TTE W/DOPPLER COMPLETE: CPT

## 2024-06-04 PROCEDURE — 93306 TTE W/DOPPLER COMPLETE: CPT | Performed by: INTERNAL MEDICINE

## 2024-06-05 ENCOUNTER — TELEPHONE (OUTPATIENT)
Dept: CARDIOLOGY | Facility: CLINIC | Age: 59
End: 2024-06-05
Payer: MEDICARE

## 2024-06-05 NOTE — TELEPHONE ENCOUNTER
----- Message from Kemi Lewis MD sent at 6/4/2024  5:38 PM EDT -----  Maira has Down's syndrome and lives in a group home. She has a . Please let  know that echo is stable (have been watching aortic valve).

## 2024-06-13 ENCOUNTER — APPOINTMENT (OUTPATIENT)
Dept: BEHAVIORAL HEALTH | Facility: CLINIC | Age: 59
End: 2024-06-13
Payer: MEDICARE

## 2024-06-20 ENCOUNTER — APPOINTMENT (OUTPATIENT)
Dept: PRIMARY CARE | Facility: CLINIC | Age: 59
End: 2024-06-20
Payer: MEDICARE

## 2024-07-08 DIAGNOSIS — F39 PSYCHOSIS, AFFECTIVE (CMS-HCC): ICD-10-CM

## 2024-07-08 RX ORDER — OLANZAPINE 2.5 MG/1
2.5 TABLET ORAL NIGHTLY
Qty: 31 TABLET | Refills: 0 | Status: SHIPPED | OUTPATIENT
Start: 2024-07-08 | End: 2024-08-08

## 2024-07-08 NOTE — PROGRESS NOTES
Good morning,  1 month refill has been sent to her [facility] pharmacy in [place].  However, this will be the last refill that I can send without her being seen in August.  I understand that she may not want to attend the virtual appointments.  Staff should still attend and give an update.  Mobile phones can then be taken to her for me to at least say hi and do a quick evaluation.  Unfortunately, our policy is 3 no-shows in a row and we terminate services.  I would like to continue seeing Ms. Grant.  Please keep her August appointment.  Nel Prieto    From: Alejandra Bowie <[email or web address]>   Sent: Saturday, 2024 10:47 AM  To: Nel Townsend <[email or web address]>  Subject: S.B     I am writing in regards to Maira Grant  1965. She is currently out of her Orlanzapine 2.5mg. She usually has it sent into [facility] is there any way that she can have this sent in asap. She has an appointment in August. Eventually would it be possible to have the meds sent to [facility]. Let me know how to get this transferred. For now, she just needs a refill. Thank you so much I appreciate it.     Alejandra Bowie   [phone]

## 2024-08-05 ENCOUNTER — APPOINTMENT (OUTPATIENT)
Dept: BEHAVIORAL HEALTH | Facility: CLINIC | Age: 59
End: 2024-08-05
Payer: MEDICARE

## 2024-08-05 DIAGNOSIS — F51.05 INSOMNIA RELATED TO ANOTHER MENTAL DISORDER: ICD-10-CM

## 2024-08-05 DIAGNOSIS — F39 PSYCHOSIS, AFFECTIVE (CMS-HCC): Primary | ICD-10-CM

## 2024-08-05 PROCEDURE — 3062F POS MACROALBUMINURIA REV: CPT | Performed by: NURSE PRACTITIONER

## 2024-08-05 PROCEDURE — 99214 OFFICE O/P EST MOD 30 MIN: CPT | Performed by: NURSE PRACTITIONER

## 2024-08-05 PROCEDURE — 3044F HG A1C LEVEL LT 7.0%: CPT | Performed by: NURSE PRACTITIONER

## 2024-08-05 PROCEDURE — 3048F LDL-C <100 MG/DL: CPT | Performed by: NURSE PRACTITIONER

## 2024-08-05 RX ORDER — TALC
POWDER (GRAM) TOPICAL
Qty: 62 TABLET | Refills: 1 | Status: SHIPPED | OUTPATIENT
Start: 2024-08-05

## 2024-08-05 RX ORDER — OLANZAPINE 5 MG/1
5 TABLET ORAL NIGHTLY
Qty: 30 TABLET | Refills: 3 | Status: SHIPPED | OUTPATIENT
Start: 2024-08-05

## 2024-08-05 NOTE — PROGRESS NOTES
ASSESSMENT: Ms. Grant presents with increased memory issues, visual hallucinations and paranoia.  Neurologist diagnosed with dementia.  Guardian is in the precontemplation stage with a dementia diagnosis.  She is seeking a third opinion.  Therefore, donepezil is on hold.  As discussed previously, starting donepezil will not reverse memory loss, but delay disease process.   See treatment plan below.     PLAN:                                                                                                                         problems treated   f/u requested to prevent relapse   medications renewed/re-ordered     1. Increase Olanzapine (Zyprexa) 5 mg (from 2.5 mg) by mouth at bedtime for psychosis.  2.  Continue melatonin 6 mg by mouth at bedtime for sleep disturbances.    3.  Risks, benefits, alternatives, off-label uses, and side effects of medications have been discussed with patient/caregiver. There is no report of signs/symptoms consistent with medication-induced impairment in daily functioning. At this time, benefits of medication felt to outweigh potential risks. Will continue to reassess need for psychotropic medication at regular 3-month intervals.  4. Return to clinic Tuesday Oct 8, 2024 at 8 AM or earlier if needed. Call [phone] to reschedule.    Thank you for seeing me today.  If you have any questions or concerns, do not hesitate to contact my office.  Nel Prieto     TREATMENT TYPE                                      counseling and coordination of care, addressing signs and symptoms of illness; risks/benefits and side effects of medications; and behavioral approaches to illness.  This note was created using electronic dictation. There may be errors in syntax and meaning. Please contact the office with any questions.   For [facility] residents, [employer] is a 24/7 hotline you can call for assistance [[phone]].   Please call 911 or go to your closest Emergency Room if you feel  worse. This includes thoughts of hurting yourself or anyone else, or having other troubles such as hearing voices, seeing visions, or having new and scary thoughts about the people around you.      Provider Impressions     PRESENT FOR APPOINTMENT    Caregiver Alejandra Bowie, known since December 2022  Client     Not present: Guardian/Sister Mickie Watson     SUBJECTIVE 59 year-old  single female with a history of moderate ID, psychosis, MDD, OCD, and sleep disturbances and PmHx diabetes, GERD, aortic regurgitation, hyperlipidemia, hypertension, hypothyroidism, ventral hernia, vitamin D, vitamin B12 deficiency, Down syndrome, and obesity presenting for medication management.  Had been living in [facility], but moved to assisted living Oct 2018. Still through [facility].      Last seen Feb 2024. No med changes.  November 2023.  At that time, melatonin was increased.  January July 2022. At that time, Zyprexa was decreased to find the lowest effective dose.  January 2021. At that time, no medication changes.  November 2021. At that time, Olanzapine (Zyprexa) was restarted.  October 2021. At that time, Melatonin was started & Olanzapine was discontinued (Guardian request as stated never saw symptoms of psychosis).  August 2021. At that time, Discontinue Melatonin    May 2021. At that time, Olanzapine was increased. In interim, July 29, 2021, Sertraline was DC'd by Neuro.   February 2021. At that time, Rexulti was DC'd and Olanzapine was started.  December 2020. At that time, Rexulti was decreased.  August 2020. At that time, Rexulti was increased.  June 2020. At that time, Rexulti was increased.  November 2019. At that time, Rexulti was increased.  September 2019. At that time, Rexulti was started.  July 2019. At that time, no med changes.  April 2019. At that time, no med changes.  November 2018. At that time, Abilify was started, Zoloft and donepezil were increased. All changes were refused by Guardian.  July  "30, 2018. At that time, no med changes.   July 2, 2018 for initial evaluation. At that time, Donepezil and Zoloft were started.        Memory: Staff report rapid progression of memory loss.  She has forgotten her own name at times and her favored staff.  They are very concerned.  Reviewed chart, where she had been on donepezil prior to meeting this clinician and how donepezil was restarted by this clinician at initial evaluation.  However, due to request of guardian, it was discontinued.  Staff will contact neurologist for official diagnosis.    No longer having screaming episodes.  History: Sleeping well at night. CPAP was DC\"d in Feb 2020 DT not using.   internal stim- \"little friends\" not problematic. No longer pounding reported in her room dt frustrated with voices. Ct reports that the devil upsets her. Has names for these \"friends.\" She does not like when staff try to talk to these friends.   Day Program: ([facility]) [facility]: M, W, F: she enjoys activities and remains social.   Fine hand tremor noted, which had resolved with the restart of Olanzapine. None with decrease to 2.5 mg.     ENT Dr Femi Bob= Biopsy of Thyroid; Bariatric consult- no changes; PCP increased Metformin.   Resided at [facility]- had to watch movies alone in the basement     While on Olanzapine: No longer cowering in her bedroom.   She is is more social, spending time in the shared living space, participating in crafts.   She continues to enjoy reading her books and coloring.     Her sister, Shwetha, takes her home every other weekend. May be for the day or the whole weekend.      HX:  1. boundary issues at prior  ([facility]). Especially with male staff. Her new home has mostly female staff and they have not noted any obsessions or boundary issues.  2. OCD: Has set routines. If her ADL's or activities are interrupted, she will refuse to do anything. When this happened, the staff had to call her sister for " "help, decreased sleep dt reports of trying to place \"Peter\" the child that she sees and hears in a closet.  She is also very methodical/particular in the way she gets dressed/prepared for the day.     MEDICATIONS: Past: Abilify, Aricept, and Amaryl (?= MELLARILL?)  Psy/SI/HI/aggression: +AH that causes her to have head pain.  .   Med Physicians:  PCP: Dr. Micki Matthew  Hematology/Oncology: Dr. Nieves at . referred by Dr. Micki Vences for further evaluation and   management of leukopenia on 02/24/2021.   Neurologist/movement disorders: Dr. Forde at   Cardiologist: Dr. Beth Verdugo= Dr. Kemi Lewis  Dermatologist: Dr. Madhav Oneil  Podiatrist: Dr. Edwin Wong  Dentist: Dr Anna Joshua  GYN: Dr Joanne Dickey at   ENT Dr Femi Bob= Biopsy of Thyroid  Gastro Dr. Carmenza Ruiz  Pulmonologist: Dr. Neely in [facility]  Otolaryngologist: Dr. Tana Cunningham [facility]  Bariatric : Dr. Chas Jett  Urgent Care Jan 2020 for UTI  Urgent Care Feb 2020 for SOB rt allergies   3/13/19 after staff states tried to get her ready for Day Program & \"She was just out of it.\" DX hypo glucose.   Metformin was decreased & Glimepiride (Amaryl) was DC'd.      ALLERGIES: NKDA     FAMILY HISTORY: Lived with mother prior to      Med SE: none reported  COMPLIANCE: good     EPS: mouth dryness, some finger intertwining, but only noted with self talk.  AIMS negative     LABS REVIEWED:  May 2023 in chart  August 2022 in chart  May 2021 in chart  May 2018: reviewed results of CMP, CBC/Diff, GFR, & TSH     EKG    Cardiologist  aortic regurgitation, hypertension  December 2021 in chart QTc 409 MS    History of Present IllnessFamily history: - Psychiatric diagnosis: denies primary relatives with serious mental illness (SA) and/or substance use disorders.   Parents: Mother: Kiera. She lived with her mother and own apartment living before moving into .   Siblings: Sisters: reconnected with in 2018. 2/3 has been talking to " weekly.: Shwetha Delgado, States that Kalpesh is not her family.   Personal history: Uncle Toribio, whom lives out of state, is her POA. Her sister is her Guardian.  Birth: Unknown  Development: Down's Syndrome  Abuse: suspected by staff  Orientation  Marriage/partner: none  Alcohol: unknown   Tobacco: unknown  Drugs: unknown  Prescribed: see below  Recreational: enjoys drawing: had 4 pictures:witches hands, baby devil, witches broom  Past psychiatric: ct states that she has seen before         Initial Fall Risk Screening:   REGULO has not fallen in the last 6 months.    Tobacco Screening: REGULO does not use tobacco.   Diabetes Evaluation:   HbA1c > or = 7%; < 8%.   Blood Pressure monitoring   Blood Pressure Controlled, Systolic <130 mm Hg   Blood Pressure Controlled, Diastolic < 80mm Hg        Review of Systems     Psychiatric: as noted in HPI.       Constitutional: sleep apnea.   Eyes: vision tested , vision impairment and wears glasses/contacts.   ENT: hearing tested, hearing loss, hearing aid  and dental problems.   Cardiovascular: heart defect.   Respiratory: no wheezing.   Gastrointestinal: diarrhea, but no constipation, no abdominal pain, no nausea, no vomiting and no reflux.   Genitourinary: no incontinence.   Musculoskeletal: moving all extremities well and symmetrical.   Integumentary: no rashes.   Neurological: headache and seizures.   Endocrine:. DM.   Hematologic/Lymphatic: no anemia.     Mental Status Exam     Appearance: well-groomed.   Build:. short stature.   Demeanor: average.   Eye Contact: average.   Motor Activity: average.   Speech: slurred.   Language: impoverished language.   Fund of Knowledge: poor fund of knowledge.   Delusions: As noted in HPI.   Self Harm: None Reported.   Aggressive: None Reported.   Mood: euthymic.   Affect: full.   Orientation: alert.   Manner: cooperative.   Thought process: concrete.   Content of thought: As noted in HPI   Abstract/ Rational Thought: mild impairment    Memory: short-term impaired, long-term impaired.   Behavior: normal motor activity.   Attention/Concentration: normal.   Insight: mild impairment.   Judgement: mild impairment.   Musculoskeletal: normal strength and tone.

## 2024-08-05 NOTE — PATIENT INSTRUCTIONS
ASSESSMENT: Ms. Grant presents with increased memory issues, visual hallucinations and paranoia.  Neurologist diagnosed with dementia.  Guardian is in the precontemplation stage with a dementia diagnosis.  She is seeking a third opinion.  Therefore, donepezil is on hold.  As discussed previously, starting donepezil will not reverse memory loss, but delay disease process.   See treatment plan below.     PLAN:                                                                                                                         problems treated   f/u requested to prevent relapse   medications renewed/re-ordered     1. Increase Olanzapine (Zyprexa) 5 mg (from 2.5 mg) by mouth at bedtime for psychosis.  2.  Continue melatonin 6 mg by mouth at bedtime for sleep disturbances.    3.  Risks, benefits, alternatives, off-label uses, and side effects of medications have been discussed with patient/caregiver. There is no report of signs/symptoms consistent with medication-induced impairment in daily functioning. At this time, benefits of medication felt to outweigh potential risks. Will continue to reassess need for psychotropic medication at regular 3-month intervals.  4. Return to clinic Tuesday Oct 8, 2024 at 8 AM or earlier if needed. Call [phone] to reschedule.    Thank you for seeing me today.  If you have any questions or concerns, do not hesitate to contact my office.  Nel Prieto

## 2024-08-13 DIAGNOSIS — R52 PAIN: Primary | ICD-10-CM

## 2024-08-13 RX ORDER — ACETAMINOPHEN 500 MG
500 TABLET ORAL EVERY 8 HOURS PRN
Qty: 30 TABLET | Refills: 0 | Status: SHIPPED | OUTPATIENT
Start: 2024-08-13

## 2024-08-28 ENCOUNTER — APPOINTMENT (OUTPATIENT)
Dept: NEUROLOGY | Facility: CLINIC | Age: 59
End: 2024-08-28
Payer: MEDICARE

## 2024-08-29 DIAGNOSIS — E11.65 TYPE 2 DIABETES MELLITUS WITH HYPERGLYCEMIA, WITHOUT LONG-TERM CURRENT USE OF INSULIN (MULTI): ICD-10-CM

## 2024-08-29 DIAGNOSIS — E55.9 VITAMIN D DEFICIENCY: ICD-10-CM

## 2024-08-29 RX ORDER — EMPAGLIFLOZIN 10 MG/1
10 TABLET, FILM COATED ORAL DAILY
Qty: 30 TABLET | Refills: 10 | Status: SHIPPED | OUTPATIENT
Start: 2024-08-29

## 2024-08-29 RX ORDER — ERGOCALCIFEROL 1.25 1/1
CAPSULE ORAL
Qty: 4 CAPSULE | Refills: 10 | Status: SHIPPED | OUTPATIENT
Start: 2024-08-29

## 2024-09-23 ENCOUNTER — APPOINTMENT (OUTPATIENT)
Dept: PRIMARY CARE | Facility: CLINIC | Age: 59
End: 2024-09-23
Payer: MEDICARE

## 2024-09-24 ENCOUNTER — LAB (OUTPATIENT)
Dept: LAB | Facility: LAB | Age: 59
End: 2024-09-24
Payer: MEDICARE

## 2024-09-24 ENCOUNTER — APPOINTMENT (OUTPATIENT)
Dept: PRIMARY CARE | Facility: CLINIC | Age: 59
End: 2024-09-24
Payer: COMMERCIAL

## 2024-09-24 VITALS
DIASTOLIC BLOOD PRESSURE: 58 MMHG | TEMPERATURE: 97.4 F | OXYGEN SATURATION: 98 % | HEIGHT: 57 IN | HEART RATE: 65 BPM | BODY MASS INDEX: 27.18 KG/M2 | RESPIRATION RATE: 16 BRPM | SYSTOLIC BLOOD PRESSURE: 120 MMHG | WEIGHT: 126 LBS

## 2024-09-24 DIAGNOSIS — E03.9 HYPOTHYROIDISM, UNSPECIFIED TYPE: ICD-10-CM

## 2024-09-24 DIAGNOSIS — Q90.9 TRISOMY 21, DOWN SYNDROME (HHS-HCC): Primary | ICD-10-CM

## 2024-09-24 DIAGNOSIS — R15.9 INCONTINENCE OF FECES, UNSPECIFIED FECAL INCONTINENCE TYPE: ICD-10-CM

## 2024-09-24 DIAGNOSIS — E04.1 THYROID NODULE: ICD-10-CM

## 2024-09-24 DIAGNOSIS — E11.69 TYPE 2 DIABETES MELLITUS WITH OTHER SPECIFIED COMPLICATION, WITHOUT LONG-TERM CURRENT USE OF INSULIN: ICD-10-CM

## 2024-09-24 DIAGNOSIS — Q23.1 BICUSPID AORTIC VALVE: ICD-10-CM

## 2024-09-24 DIAGNOSIS — E11.65 TYPE 2 DIABETES MELLITUS WITH HYPERGLYCEMIA, WITHOUT LONG-TERM CURRENT USE OF INSULIN: ICD-10-CM

## 2024-09-24 DIAGNOSIS — Z00.00 ROUTINE GENERAL MEDICAL EXAMINATION AT HEALTH CARE FACILITY: ICD-10-CM

## 2024-09-24 DIAGNOSIS — R13.10 DYSPHAGIA, UNSPECIFIED TYPE: ICD-10-CM

## 2024-09-24 LAB
ANION GAP SERPL CALC-SCNC: 12 MMOL/L (ref 10–20)
BUN SERPL-MCNC: 21 MG/DL (ref 6–23)
CALCIUM SERPL-MCNC: 9.2 MG/DL (ref 8.6–10.6)
CHLORIDE SERPL-SCNC: 103 MMOL/L (ref 98–107)
CO2 SERPL-SCNC: 27 MMOL/L (ref 21–32)
CREAT SERPL-MCNC: 1.02 MG/DL (ref 0.5–1.05)
EGFRCR SERPLBLD CKD-EPI 2021: 64 ML/MIN/1.73M*2
GLUCOSE SERPL-MCNC: 249 MG/DL (ref 74–99)
POC HEMOGLOBIN A1C: 6.8 % (ref 4.2–6.5)
POTASSIUM SERPL-SCNC: 4.1 MMOL/L (ref 3.5–5.3)
SODIUM SERPL-SCNC: 138 MMOL/L (ref 136–145)
T4 FREE SERPL-MCNC: 1.58 NG/DL (ref 0.78–1.48)
TSH SERPL-ACNC: 0.2 MIU/L (ref 0.44–3.98)

## 2024-09-24 PROCEDURE — 36415 COLL VENOUS BLD VENIPUNCTURE: CPT

## 2024-09-24 PROCEDURE — 83036 HEMOGLOBIN GLYCOSYLATED A1C: CPT | Performed by: STUDENT IN AN ORGANIZED HEALTH CARE EDUCATION/TRAINING PROGRAM

## 2024-09-24 PROCEDURE — 3008F BODY MASS INDEX DOCD: CPT | Performed by: STUDENT IN AN ORGANIZED HEALTH CARE EDUCATION/TRAINING PROGRAM

## 2024-09-24 PROCEDURE — 99214 OFFICE O/P EST MOD 30 MIN: CPT | Performed by: STUDENT IN AN ORGANIZED HEALTH CARE EDUCATION/TRAINING PROGRAM

## 2024-09-24 PROCEDURE — 3044F HG A1C LEVEL LT 7.0%: CPT | Performed by: STUDENT IN AN ORGANIZED HEALTH CARE EDUCATION/TRAINING PROGRAM

## 2024-09-24 PROCEDURE — 3062F POS MACROALBUMINURIA REV: CPT | Performed by: STUDENT IN AN ORGANIZED HEALTH CARE EDUCATION/TRAINING PROGRAM

## 2024-09-24 PROCEDURE — 84439 ASSAY OF FREE THYROXINE: CPT

## 2024-09-24 PROCEDURE — 3048F LDL-C <100 MG/DL: CPT | Performed by: STUDENT IN AN ORGANIZED HEALTH CARE EDUCATION/TRAINING PROGRAM

## 2024-09-24 PROCEDURE — 84443 ASSAY THYROID STIM HORMONE: CPT

## 2024-09-24 PROCEDURE — 1036F TOBACCO NON-USER: CPT | Performed by: STUDENT IN AN ORGANIZED HEALTH CARE EDUCATION/TRAINING PROGRAM

## 2024-09-24 PROCEDURE — 3078F DIAST BP <80 MM HG: CPT | Performed by: STUDENT IN AN ORGANIZED HEALTH CARE EDUCATION/TRAINING PROGRAM

## 2024-09-24 PROCEDURE — 3074F SYST BP LT 130 MM HG: CPT | Performed by: STUDENT IN AN ORGANIZED HEALTH CARE EDUCATION/TRAINING PROGRAM

## 2024-09-24 PROCEDURE — 80048 BASIC METABOLIC PNL TOTAL CA: CPT

## 2024-09-24 NOTE — PROGRESS NOTES
"Subjective   Patient ID: Ene Grant is a 59 y.o. female who presents for Follow-up.  HPI    Since last appointment she has had bowel incontinence.  She was recommended to follow-up with [facility].  She saw [facility] and was given MiraLAX as well as plan for upper endoscopy.    Diarrhea much improved since starting miralax.     She has been exercising more and trying to lose weight.     Has not had blood work yet.     Review of Systems   Reason unable to perform ROS: Other, denies any issues.       /58   Pulse 65   Temp 36.3 °C (97.4 °F) (Temporal)   Resp 16   Ht 1.454 m (4' 9.25\")   Wt 57.2 kg (126 lb)   SpO2 98%   BMI 27.03 kg/m²     Objective   Physical Exam  Vitals reviewed.   HENT:      Head: Normocephalic.   Cardiovascular:      Rate and Rhythm: Normal rate and regular rhythm.   Pulmonary:      Effort: Pulmonary effort is normal. No respiratory distress.      Breath sounds: Normal breath sounds.   Abdominal:      General: Bowel sounds are normal. There is no distension.      Palpations: Abdomen is soft. There is no mass.      Tenderness: There is no abdominal tenderness. There is no guarding or rebound.   Musculoskeletal:         General: No deformity.   Skin:     Coloration: Skin is not jaundiced.   Neurological:      Mental Status: She is alert.   Psychiatric:         Mood and Affect: Mood normal.         Behavior: Behavior normal.         Assessment/Plan   Problem List Items Addressed This Visit       Bicuspid aortic valve (HHS-HCC)    Hypothyroidism    Thyroid nodule    Trisomy 21, 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HHS-HCC) - Primary    Type 2 diabetes mellitus with other specified complication, without long-term current use of insulin (Multi)    Relevant Orders    POCT glycosylated hemoglobin (Hb A1C) manually resulted (Completed)    Incontinence of feces    Dysphagia     Other Visit Diagnoses       Routine general medical examination at health care facility          "         Fecal incontinence secondary to constipation with overflow versus other etiology  - Saw GI, was given 1 capful of MiraLAX daily, hold for greater than 3 bowel movements a day  -Recommend close GI follow up     Dysphagia  - EGD ordered by GI, planning for October 2024    Dementia  - Saw psychiatry, olanzapine increased to 5 mg a day, melatonin 6 mg was continued  - Follow-up with psychiatry Tuesday, October 8.    Weight loss-check labs  -if continues- may need CT C/A/P   -Mild, follow-up GI     Ventral hernia: saw surgery for this     Tremor: med induced, saw neuro, from zyprexa/rexulti  -resolved     Memory changes- saw neuro  -Had labs  -S/p MRI brain and f/u neurology     Thyroid nodule: s/p left sided thyroidectomy  -healing well  -TSH WNL 12/22 at ENT at [facility]  -Overdue repeat TSH, ordered.     Type 2 diabetes: 12/20: 6.0--> 6.8--> 7.1--> 6.8--> 7.7--> 7.6--> 6.6--> 6.1--> 6.4 --> 6.8  -Higher doses of metformin cause diarrhea  -On metformin and Jardiance  -saw endo and they agree with our plan (family wanted her to see)  -urine albumin normal 2/24  -f/u 3 months     Biscupid aortic valve with AS: seeing Dr. Lewis  -asymptomatic     Leukopenia: saw heme and med induced  -monitor     Hyperlipidemia: on simvastatin and fenofibrate  -2/24 controlled   -was on aspirin for primary prevention but was stopped by cardiology     Hypothyroidism: on synthroid  -12/22 controlled, getting checked with ENT--has not been checked  -On levothyroxine 75mcg/day, overdue TSH check, she will get today.      Behavioral issues/Dementia: sees psych  -Was on zoloft and rexulti, now on olanzapine and melatonin     Down syndrome: lives at [facility]     Vitamin b12/D deficiency: WNL 12/20     Headache: prn tylenol  GERD: on PPI     Allergies: on zyrtec and flonase     Return 3 months for a1c and labs now. Stressed importance of followup  Eye doc; Dr. Gonzalez  GI: [facility]. Ruiz ([facility])  Derm: Dr. Oneil  [facility]  Maintenance  -Pap smear: sees gyn  -Vaccinations: flu shot UTD. Pneumovax booster, shingrix, and tdap (2012). Advised tdap  -Mammogram: 1/24---neg, given order for August  -Colonoscopy: 11/9/17-normal, repeat 10 years (Dr. Ruiz)-records received  -DEXA: at 66

## 2024-09-26 DIAGNOSIS — J30.2 SEASONAL ALLERGIES: ICD-10-CM

## 2024-09-26 DIAGNOSIS — E78.5 DYSLIPIDEMIA: ICD-10-CM

## 2024-09-26 DIAGNOSIS — E53.9 VITAMIN B DEFICIENCY: ICD-10-CM

## 2024-09-26 DIAGNOSIS — K21.9 CHRONIC GERD: ICD-10-CM

## 2024-09-26 DIAGNOSIS — F51.05 INSOMNIA RELATED TO ANOTHER MENTAL DISORDER: ICD-10-CM

## 2024-09-26 DIAGNOSIS — E03.9 HYPOTHYROIDISM, UNSPECIFIED TYPE: ICD-10-CM

## 2024-09-26 RX ORDER — TALC
POWDER (GRAM) TOPICAL
Qty: 46 TABLET | Refills: 0 | Status: SHIPPED | OUTPATIENT
Start: 2024-09-26

## 2024-09-26 RX ORDER — LEVOTHYROXINE SODIUM 50 UG/1
50 TABLET ORAL DAILY
Qty: 30 TABLET | Refills: 2 | Status: SHIPPED | OUTPATIENT
Start: 2024-09-26

## 2024-09-26 RX ORDER — SIMVASTATIN 40 MG/1
40 TABLET, FILM COATED ORAL NIGHTLY
Qty: 90 TABLET | Refills: 0 | Status: SHIPPED | OUTPATIENT
Start: 2024-09-26

## 2024-09-26 RX ORDER — FENOFIBRATE 145 MG/1
TABLET, FILM COATED ORAL
Qty: 90 TABLET | Refills: 0 | Status: SHIPPED | OUTPATIENT
Start: 2024-09-26

## 2024-09-26 RX ORDER — CETIRIZINE HYDROCHLORIDE 10 MG/1
10 TABLET ORAL DAILY
Qty: 90 TABLET | Refills: 0 | Status: SHIPPED | OUTPATIENT
Start: 2024-09-26

## 2024-09-26 RX ORDER — LANOLIN ALCOHOL/MO/W.PET/CERES
1000 CREAM (GRAM) TOPICAL DAILY
Qty: 90 TABLET | Refills: 0 | Status: SHIPPED | OUTPATIENT
Start: 2024-09-26

## 2024-09-26 RX ORDER — OMEPRAZOLE 20 MG/1
20 CAPSULE, DELAYED RELEASE ORAL
Qty: 90 CAPSULE | Refills: 0 | Status: SHIPPED | OUTPATIENT
Start: 2024-09-26

## 2024-09-26 NOTE — RESULT ENCOUNTER NOTE
Please Call Ene and let her know that her thyroid studies suggest that she needs a slightly lower thyroid medicine dose.  I sent the next size smaller to her pharmacy for her to take 1 daily.  Additionally her electrolyte and kidney labs look fine.  Her nonfasting glucose is slightly elevated.  Please have her continue to watch her glucose values.  Please have her touch base w/ Dr. Matthew in 8 weeks to recheck the thyroid labs.  Thanks!    Dr. Hilliard

## 2024-10-08 ENCOUNTER — APPOINTMENT (OUTPATIENT)
Dept: BEHAVIORAL HEALTH | Facility: CLINIC | Age: 59
End: 2024-10-08
Payer: MEDICARE

## 2024-10-08 VITALS — BODY MASS INDEX: 27.53 KG/M2 | HEIGHT: 57 IN | WEIGHT: 127.6 LBS | RESPIRATION RATE: 17 BRPM

## 2024-10-08 DIAGNOSIS — F51.05 INSOMNIA RELATED TO ANOTHER MENTAL DISORDER: ICD-10-CM

## 2024-10-08 DIAGNOSIS — F39 PSYCHOSIS, AFFECTIVE (CMS-HCC): Primary | ICD-10-CM

## 2024-10-08 DIAGNOSIS — Z79.899 ENCOUNTER FOR LONG-TERM (CURRENT) USE OF MEDICATIONS: ICD-10-CM

## 2024-10-08 PROCEDURE — 3048F LDL-C <100 MG/DL: CPT | Performed by: NURSE PRACTITIONER

## 2024-10-08 PROCEDURE — 3008F BODY MASS INDEX DOCD: CPT | Performed by: NURSE PRACTITIONER

## 2024-10-08 PROCEDURE — 3044F HG A1C LEVEL LT 7.0%: CPT | Performed by: NURSE PRACTITIONER

## 2024-10-08 PROCEDURE — 1036F TOBACCO NON-USER: CPT | Performed by: NURSE PRACTITIONER

## 2024-10-08 PROCEDURE — 99214 OFFICE O/P EST MOD 30 MIN: CPT | Performed by: NURSE PRACTITIONER

## 2024-10-08 PROCEDURE — 3062F POS MACROALBUMINURIA REV: CPT | Performed by: NURSE PRACTITIONER

## 2024-10-08 RX ORDER — OLANZAPINE 5 MG/1
5 TABLET ORAL NIGHTLY
Qty: 31 TABLET | Refills: 3 | Status: SHIPPED | OUTPATIENT
Start: 2024-10-08

## 2024-10-08 RX ORDER — TALC
POWDER (GRAM) TOPICAL
Qty: 62 TABLET | Refills: 3 | Status: SHIPPED | OUTPATIENT
Start: 2024-10-08

## 2024-10-08 ASSESSMENT — ABNORMAL INVOLUNTARY MOVEMENT SCALE (AIMS)
LOWER_BODY_EXTREMITIES: NONE, NORMAL
UPPER_BODY_EXTREMITIES: NONE, NORMAL
NECK_SHOULDER_HIPS: NONE, NORMAL
JAW: NONE, NORMAL
AIMS_SEVERITY: 0
FACIAL_EXPRESSION_MUSCLES: NONE, NORMAL
TONGUE: NONE, NORMAL
AIMS_PATIENT_AWARENESS: NO AWARENESS
LIPS_PARIETAL: NONE, NORMAL
AIMS_PATIENT_INCAPACITATION: NONE, NORMAL

## 2024-10-08 NOTE — PATIENT INSTRUCTIONS
ASSESSMENT: Ms. Grant presents at baseline mental health wise, with the exception of continued memory issues.  Neurologist diagnosed with dementia.  Guardian is in the precontemplation stage with a dementia diagnosis.  She is seeking a third opinion.  Therefore, donepezil is on hold.  As discussed previously, starting donepezil will not reverse memory loss, but delay disease process.   See treatment plan below.     PLAN:                                                                                                              problems treated   f/u requested to prevent relapse   medications renewed/re-ordered     1.  Continue olanzapine (Zyprexa) 5 mg by mouth at bedtime for psychosis.  2.  Continue melatonin 6 mg by mouth at bedtime for sleep disturbances.    3.  Risks, benefits, alternatives, off-label uses, and side effects of medications have been discussed with patient/caregiver. There is no report of signs/symptoms consistent with medication-induced impairment in daily functioning. At this time, benefits of medication felt to outweigh potential risks. Will continue to reassess need for psychotropic medication at regular 3-month intervals.  4. Return to clinic Monday, January 6, 2025 at 8 AM virtual or earlier if needed. Call [phone] to reschedule.  5.  Order for CBC/differential added to thyroid test to be completed in the next month.    Thank you for seeing me today.  If you have any questions or concerns, do not hesitate to contact my office.  Nel Prieto     TREATMENT TYPE                                      counseling and coordination of care, addressing signs and symptoms of illness; risks/benefits and side effects of medications; and behavioral approaches to illness.  This note was created using electronic dictation. There may be errors in syntax and meaning. Please contact the office with any questions.

## 2024-10-08 NOTE — PROGRESS NOTES
ASSESSMENT: Ms. Grant presents at baseline mental health wise, with the exception of continued memory issues.  Neurologist diagnosed with dementia.  Guardian is in the precontemplation stage with a dementia diagnosis.  She is seeking a third opinion.  Therefore, donepezil is on hold.  As discussed previously, starting donepezil will not reverse memory loss, but delay disease process.   See treatment plan below.     PLAN:                                                                                                              problems treated   f/u requested to prevent relapse   medications renewed/re-ordered     1.  Continue olanzapine (Zyprexa) 5 mg by mouth at bedtime for psychosis.  2.  Continue melatonin 6 mg by mouth at bedtime for sleep disturbances.    3.  Risks, benefits, alternatives, off-label uses, and side effects of medications have been discussed with patient/caregiver. There is no report of signs/symptoms consistent with medication-induced impairment in daily functioning. At this time, benefits of medication felt to outweigh potential risks. Will continue to reassess need for psychotropic medication at regular 3-month intervals.  4. Return to clinic Monday, January 6, 2025 at 8 AM virtual or earlier if needed. Call [phone] to reschedule.  5.  Order for CBC/differential added to thyroid test to be completed in the next month.    Thank you for seeing me today.  If you have any questions or concerns, do not hesitate to contact my office.  Nel Prieto     TREATMENT TYPE                                      counseling and coordination of care, addressing signs and symptoms of illness; risks/benefits and side effects of medications; and behavioral approaches to illness.  This note was created using electronic dictation. There may be errors in syntax and meaning. Please contact the office with any questions.   For [facility] residents, [employer] is a 24/7 hotline you can call for  assistance [[phone]].   Please call 911 or go to your closest Emergency Room if you feel worse. This includes thoughts of hurting yourself or anyone else, or having other troubles such as hearing voices, seeing visions, or having new and scary thoughts about the people around you.      Provider Impressions     PRESENT FOR APPOINTMENT  Client   Caregiver  Cecille Schaefer, known 1.5 years  Not present: Guardian/Sister Mickie Bowie, known since December 2022    SUBJECTIVE 59 year-old  single female with a history of moderate ID, psychosis, MDD, OCD, and sleep disturbances and PmHx diabetes, GERD, aortic regurgitation, hyperlipidemia, hypertension, hypothyroidism, ventral hernia, vitamin D, vitamin B12 deficiency, Down syndrome, and obesity presenting for medication management.  Had been living in [facility], but moved to assisted living Oct 2018. Still through [facility].      Last seen August 2024.  At that time, olanzapine was increased.  Feb 2024. No med changes.  November 2023.  At that time, melatonin was increased.  January July 2022. At that time, Zyprexa was decreased to find the lowest effective dose.  January 2021. At that time, no medication changes.  November 2021. At that time, Olanzapine (Zyprexa) was restarted.  October 2021. At that time, Melatonin was started & Olanzapine was discontinued (Guardian request as stated never saw symptoms of psychosis).  August 2021. At that time, Discontinue Melatonin    May 2021. At that time, Olanzapine was increased. In interim, July 29, 2021, Sertraline was DC'd by Neuro.   February 2021. At that time, Rexulti was DC'd and Olanzapine was started.  December 2020. At that time, Rexulti was decreased.  August 2020. At that time, Rexulti was increased.  June 2020. At that time, Rexulti was increased.  November 2019. At that time, Rexulti was increased.  September 2019. At that time, Rexulti was started.  July 2019. At that time, no med  "changes.  April 2019. At that time, no med changes.  November 2018. At that time, Abilify was started, Zoloft and donepezil were increased. All changes were refused by Guardian.  July 30, 2018. At that time, no med changes.   July 2, 2018 for initial evaluation. At that time, Donepezil and Zoloft were started.      Staff works with her overnight and reports that she sleeps during the night.  However, does report an improvement in the morning.  Maira is no longer getting up and down, talking about children that are not there and is able to sit and calmly watch TV until it is time for her to leave.  A.m. blood sugar 99 September 2024 thyroid elevated and Synthroid was decreased.  Maira reports that \"I want to leave with Jesus Manuel\".  She states she does not have anything to talk about with this clinician and that everything is good.  Memory: Staff report rapid progression of memory loss.  She has forgotten her own name at times and her favored staff.  They are very concerned.  Reviewed chart, where she had been on donepezil prior to meeting this clinician and how donepezil was restarted by this clinician at initial evaluation.  However, due to request of guardian, it was discontinued.    Neurologist diagnosed with dementia.  Guardian is in the precontemplation stage with a dementia diagnosis.  She is seeking a third opinion.  Therefore, donepezil is on hold.  As discussed previously, starting donepezil will not reverse memory loss, but delay disease process.  No longer having screaming episodes.  History: Sleeping well at night. CPAP was DC\"d in Feb 2020 DT not using.   internal stim- \"little friends\" not problematic. No longer pounding reported in her room dt frustrated with voices. Ct reports that the devil upsets her. Has names for these \"friends.\" She does not like when staff try to talk to these friends.   Day Program: ([facility]) [facility]: M, W, F: she enjoys activities and remains social.      " "  ENT Dr Kahn Mumford= Biopsy of Thyroid; Bariatric consult- no changes; PCP increased Metformin.   Resided at [facility]- had to watch movies alone in the basement     While on Olanzapine: No longer cowering in her bedroom.   She is is more social, spending time in the shared living space, participating in crafts.   She continues to enjoy reading her books and coloring.     Her sister, Shwetha, takes her home every other weekend. May be for the day or the whole weekend.      HX:  1. boundary issues at prior  ([facility]). Especially with male staff. Her new home has mostly female staff and they have not noted any obsessions or boundary issues.  2. OCD: Has set routines. If her ADL's or activities are interrupted, she will refuse to do anything. When this happened, the staff had to call her sister for help, decreased sleep dt reports of trying to place \"Peter\" the child that she sees and hears in a closet.  She is also very methodical/particular in the way she gets dressed/prepared for the day.     MEDICATIONS: Past: Abilify, Aricept, and Amaryl (?= MELLARILL?)  Psy/SI/HI/aggression: +AH that causes her to have head pain.  .   Med Physicians:  PCP: Dr. Micki Matthew  Hematology/Oncology: Dr. Nieves at . referred by Dr. Micki Vences for further evaluation and   management of leukopenia on 02/24/2021.   Neurologist/movement disorders: Dr. Forde at   Cardiologist: Dr. Beth Verdugo= Dr. Kemi Lewis  Dermatologist: Dr. Madhav Oneil  Podiatrist: Dr. Edwin Wong  Dentist: Dr Anna Joshua  GYN: Dr Joanne Dickey at   ENT Dr Kahn Lisbeth= Biopsy of Thyroid  Gastro Dr. Carmenza Ruiz  Pulmonologist: Dr. Neely in [facility]  Otolaryngologist: Dr. Tana Cunningham [facility]  Bariatric : Dr. Chas Jett  Urgent Care Jan 2020 for UTI  Urgent Care Feb 2020 for SOB rt allergies   3/13/19 after staff states tried to get her ready for Day Program & \"She was just out of it.\" DX hypo glucose.   Metformin " was decreased & Glimepiride (Amaryl) was DC'd.      ALLERGIES: NKDA     FAMILY HISTORY: Lived with mother prior to      Med SE: none reported  COMPLIANCE: good     EPS: mouth dryness, some finger intertwining, but only noted with self talk. Fine hand tremor noted, which had resolved with the restart of Olanzapine. None with decrease to 2.5 mg.  AIMS negative 10/8/2024     LABS REVIEWED:  September 2024 no CBC/differential  May 2023 in chart  August 2022 in chart  May 2021 in chart  May 2018: reviewed results of CMP, CBC/Diff, GFR, & TSH     EKG    Cardiologist  aortic regurgitation, hypertension  December 2021 in chart QTc 409 MS    History of Present IllnessFamily history: - Psychiatric diagnosis: denies primary relatives with serious mental illness (SA) and/or substance use disorders.   Parents: Mother: Kiera. She lived with her mother and own apartment living before moving into .   Siblings: Sisters: reconnected with in 2018. 2/3 has been talking to weekly.: Shwetha Delgado, States that Kalpesh is not her family.   Personal history: Uncle Toribio, whom lives out of state, is her POA. Her sister is her Guardian.  Birth: Unknown  Development: Down's Syndrome  Abuse: suspected by staff  Orientation  Marriage/partner: none  Alcohol: unknown   Tobacco: unknown  Drugs: unknown  Prescribed: see below  Recreational: enjoys drawing: had 4 pictures:witches hands, baby devil, witches broom  Past psychiatric: ct states that she has seen before         Initial Fall Risk Screening:   REGULO has not fallen in the last 6 months.    Tobacco Screening: REGULO does not use tobacco.   Diabetes Evaluation:   HbA1c > or = 7%; < 8%.   Blood Pressure monitoring   Blood Pressure Controlled, Systolic <130 mm Hg   Blood Pressure Controlled, Diastolic < 80mm Hg        Review of Systems     Psychiatric: as noted in HPI.       Constitutional: sleep apnea.   Eyes: vision tested , vision impairment and wears glasses/contacts.   ENT: hearing  tested, hearing loss, hearing aid  and dental problems.   Cardiovascular: heart defect.   Respiratory: no wheezing.   Gastrointestinal: diarrhea, but no constipation, no abdominal pain, no nausea, no vomiting and no reflux.   Genitourinary: no incontinence.   Musculoskeletal: moving all extremities well and symmetrical.   Integumentary: no rashes.   Neurological: headache and seizures.   Endocrine:. DM.   Hematologic/Lymphatic: no anemia.     Mental Status Exam     Appearance: well-groomed.   Build:. short stature.   Demeanor: average.   Eye Contact: average.   Motor Activity: average.   Speech: slurred.   Language: impoverished language.   Fund of Knowledge: poor fund of knowledge.   Delusions: As noted in HPI.   Self Harm: None Reported.   Aggressive: None Reported.   Mood: euthymic.   Affect: full.   Orientation: alert.   Manner: cooperative.   Thought process: concrete.   Content of thought: As noted in HPI   Abstract/ Rational Thought: mild impairment   Memory: short-term impaired, long-term impaired.   Behavior: normal motor activity.   Attention/Concentration: normal.   Insight: mild impairment.   Judgement: mild impairment.   Musculoskeletal: normal strength and tone.

## 2024-10-29 ENCOUNTER — OFFICE VISIT (OUTPATIENT)
Dept: CARDIOLOGY | Facility: CLINIC | Age: 59
End: 2024-10-29
Payer: MEDICARE

## 2024-10-29 VITALS
HEART RATE: 77 BPM | HEIGHT: 57 IN | SYSTOLIC BLOOD PRESSURE: 101 MMHG | BODY MASS INDEX: 26.32 KG/M2 | OXYGEN SATURATION: 96 % | WEIGHT: 122 LBS | DIASTOLIC BLOOD PRESSURE: 62 MMHG

## 2024-10-29 DIAGNOSIS — I35.0 AORTIC VALVE STENOSIS, ETIOLOGY OF CARDIAC VALVE DISEASE UNSPECIFIED: ICD-10-CM

## 2024-10-29 DIAGNOSIS — Q90.9 TRISOMY 21, DOWN SYNDROME (HHS-HCC): ICD-10-CM

## 2024-10-29 DIAGNOSIS — E78.5 DYSLIPIDEMIA: ICD-10-CM

## 2024-10-29 DIAGNOSIS — Q23.81 BICUSPID AORTIC VALVE: Primary | ICD-10-CM

## 2024-10-29 PROCEDURE — 3048F LDL-C <100 MG/DL: CPT | Performed by: INTERNAL MEDICINE

## 2024-10-29 PROCEDURE — 3074F SYST BP LT 130 MM HG: CPT | Performed by: INTERNAL MEDICINE

## 2024-10-29 PROCEDURE — 99214 OFFICE O/P EST MOD 30 MIN: CPT | Performed by: INTERNAL MEDICINE

## 2024-10-29 PROCEDURE — 3078F DIAST BP <80 MM HG: CPT | Performed by: INTERNAL MEDICINE

## 2024-10-29 PROCEDURE — 3044F HG A1C LEVEL LT 7.0%: CPT | Performed by: INTERNAL MEDICINE

## 2024-10-29 PROCEDURE — 3062F POS MACROALBUMINURIA REV: CPT | Performed by: INTERNAL MEDICINE

## 2024-10-29 PROCEDURE — 1036F TOBACCO NON-USER: CPT | Performed by: INTERNAL MEDICINE

## 2024-10-29 PROCEDURE — 3008F BODY MASS INDEX DOCD: CPT | Performed by: INTERNAL MEDICINE

## 2024-10-29 RX ORDER — POLYETHYLENE GLYCOL 3350 17 G/17G
17 POWDER, FOR SOLUTION ORAL
COMMUNITY
Start: 2024-09-05

## 2024-10-29 ASSESSMENT — COLUMBIA-SUICIDE SEVERITY RATING SCALE - C-SSRS
2. HAVE YOU ACTUALLY HAD ANY THOUGHTS OF KILLING YOURSELF?: NO
1. IN THE PAST MONTH, HAVE YOU WISHED YOU WERE DEAD OR WISHED YOU COULD GO TO SLEEP AND NOT WAKE UP?: NO
6. HAVE YOU EVER DONE ANYTHING, STARTED TO DO ANYTHING, OR PREPARED TO DO ANYTHING TO END YOUR LIFE?: NO

## 2024-10-29 ASSESSMENT — PAIN SCALES - GENERAL: PAINLEVEL_OUTOF10: 0-NO PAIN

## 2024-11-27 ENCOUNTER — PATIENT MESSAGE (OUTPATIENT)
Dept: PRIMARY CARE | Facility: CLINIC | Age: 59
End: 2024-11-27
Payer: MEDICARE

## 2024-11-27 DIAGNOSIS — R13.11 ORAL PHASE DYSPHAGIA: Primary | ICD-10-CM

## 2024-12-09 ENCOUNTER — PATIENT MESSAGE (OUTPATIENT)
Dept: PRIMARY CARE | Facility: CLINIC | Age: 59
End: 2024-12-09
Payer: MEDICARE

## 2024-12-09 DIAGNOSIS — J06.9 UPPER RESPIRATORY TRACT INFECTION, UNSPECIFIED TYPE: Primary | ICD-10-CM

## 2024-12-09 RX ORDER — DIPHENHYDRAMINE HCL 25 MG
2 CAPSULE ORAL EVERY 4 HOURS PRN
Qty: 30 CAPSULE | Refills: 1 | Status: SHIPPED | OUTPATIENT
Start: 2024-12-09

## 2024-12-13 ENCOUNTER — PATIENT MESSAGE (OUTPATIENT)
Dept: PRIMARY CARE | Facility: CLINIC | Age: 59
End: 2024-12-13
Payer: MEDICARE

## 2024-12-13 DIAGNOSIS — K59.00 CONSTIPATION, UNSPECIFIED CONSTIPATION TYPE: Primary | ICD-10-CM

## 2024-12-13 RX ORDER — POLYETHYLENE GLYCOL 3350 17 G/17G
17 POWDER, FOR SOLUTION ORAL DAILY PRN
Qty: 510 G | Refills: 1 | Status: SHIPPED | OUTPATIENT
Start: 2024-12-13

## 2024-12-13 RX ORDER — POLYETHYLENE GLYCOL 3350 17 G/17G
17 POWDER, FOR SOLUTION ORAL DAILY PRN
Qty: 510 G | Refills: 1 | Status: SHIPPED | OUTPATIENT
Start: 2024-12-13 | End: 2024-12-13 | Stop reason: SDUPTHER

## 2024-12-17 ENCOUNTER — APPOINTMENT (OUTPATIENT)
Dept: RADIOLOGY | Facility: HOSPITAL | Age: 59
End: 2024-12-17
Payer: MEDICARE

## 2024-12-18 DIAGNOSIS — E03.9 HYPOTHYROIDISM, UNSPECIFIED TYPE: ICD-10-CM

## 2024-12-19 DIAGNOSIS — Z00.00 ROUTINE HEALTH MAINTENANCE: Primary | ICD-10-CM

## 2024-12-19 DIAGNOSIS — E03.9 HYPOTHYROIDISM, UNSPECIFIED TYPE: ICD-10-CM

## 2024-12-19 RX ORDER — LEVOTHYROXINE SODIUM 50 UG/1
TABLET ORAL
Qty: 21 TABLET | Refills: 0 | Status: SHIPPED | OUTPATIENT
Start: 2024-12-19

## 2025-01-02 ENCOUNTER — APPOINTMENT (OUTPATIENT)
Dept: RADIOLOGY | Facility: HOSPITAL | Age: 60
End: 2025-01-02
Payer: MEDICARE

## 2025-01-03 ENCOUNTER — APPOINTMENT (OUTPATIENT)
Dept: RADIOLOGY | Facility: HOSPITAL | Age: 60
End: 2025-01-03
Payer: MEDICARE

## 2025-01-06 ENCOUNTER — APPOINTMENT (OUTPATIENT)
Dept: BEHAVIORAL HEALTH | Facility: CLINIC | Age: 60
End: 2025-01-06
Payer: MEDICARE

## 2025-01-07 ENCOUNTER — APPOINTMENT (OUTPATIENT)
Dept: NEUROLOGY | Facility: CLINIC | Age: 60
End: 2025-01-07
Payer: MEDICARE

## 2025-01-07 VITALS
BODY MASS INDEX: 26.71 KG/M2 | HEIGHT: 57 IN | HEART RATE: 68 BPM | WEIGHT: 123.8 LBS | RESPIRATION RATE: 11 BRPM | SYSTOLIC BLOOD PRESSURE: 106 MMHG | DIASTOLIC BLOOD PRESSURE: 65 MMHG | TEMPERATURE: 97.5 F

## 2025-01-07 DIAGNOSIS — R90.89 ABNORMAL FINDING ON MRI OF BRAIN: Primary | ICD-10-CM

## 2025-01-07 DIAGNOSIS — G30.0 ALZHEIMER'S DISEASE WITH EARLY ONSET (CODE): ICD-10-CM

## 2025-01-07 DIAGNOSIS — R41.3 MEMORY LOSS: ICD-10-CM

## 2025-01-07 PROCEDURE — 99214 OFFICE O/P EST MOD 30 MIN: CPT | Performed by: PSYCHIATRY & NEUROLOGY

## 2025-01-07 PROCEDURE — 3078F DIAST BP <80 MM HG: CPT | Performed by: PSYCHIATRY & NEUROLOGY

## 2025-01-07 PROCEDURE — 3074F SYST BP LT 130 MM HG: CPT | Performed by: PSYCHIATRY & NEUROLOGY

## 2025-01-07 PROCEDURE — G2211 COMPLEX E/M VISIT ADD ON: HCPCS | Performed by: PSYCHIATRY & NEUROLOGY

## 2025-01-07 PROCEDURE — 1036F TOBACCO NON-USER: CPT | Performed by: PSYCHIATRY & NEUROLOGY

## 2025-01-07 PROCEDURE — 3008F BODY MASS INDEX DOCD: CPT | Performed by: PSYCHIATRY & NEUROLOGY

## 2025-01-07 ASSESSMENT — PAIN SCALES - GENERAL: PAINLEVEL_OUTOF10: 0-NO PAIN

## 2025-01-07 ASSESSMENT — ENCOUNTER SYMPTOMS
TREMORS: 1
LOSS OF SENSATION IN FEET: 0
DEPRESSION: 0
OCCASIONAL FEELINGS OF UNSTEADINESS: 0

## 2025-01-07 NOTE — PATIENT INSTRUCTIONS
"It was a pleasure seeing you today.     The brain MRI shows shrinkage but it is not a specific finding.  Will monitor and I think she is doing well.    Please make a follow up appointment in 6-9 months.  You may also schedule a phone or virtual visit sooner on a Friday morning with me as needed before the next clinic appointment.     For any urgent issues or needing to speak to a medical assistant please call [phone], option 6 during our office hours Monday-Friday 8am-4pm, and leave a voicemail with your concern.  My office will try to reach back you as soon as possible within 24 (business) hours.  If you have an emergency please call 911 or visit a local urgent care or nearest emergency room.      Please understand that Scirra is a useful communication tool for simple \"normal\" results or a refill request but I would not recommend using this tool for emergent or urgent issues or for conversations with me.  I am happy to ask my staff to rearrange a follow up visit or a virtual visit sooner than requested if appropriate for your care.    "

## 2025-01-07 NOTE — PROGRESS NOTES
Subjective     Ene Grant is a 59 y.o. year old female here for memory loss /dementia follow-up.    Tremor  Patient is here to review MRI that was done with her PCP.  I last saw patient 1 year ago.  Her last MRI was done in March 27, 2024 which did show asymmetric volume loss on the left hippocampi.  Nonspecific.  She is here with an aide who has notes to review from who she lives with.   She has a history of Down syndrome, MDD, psychosis, OCD and sleep issues.    Right hand tremor is occasional, new.     Patient follows with psychiatry - on Zyprexa.  Hx of vit b12 def and hypothyroidism.     Blood work was reviewed from September 2024.  Normal kidney functions.  Normal creatinine.  Her TSH was low at 0.2 and free thyroxine was elevated.  Sugars were also elevated.  Review of Systems  Denies syncope, denies HA.  Denies vision changes.  Denies numbness/tingling.  Denies chest pain or fatigue.    Patient Active Problem List   Diagnosis    Bicuspid aortic valve    Bunion    Chronic GERD    Conductive hearing loss, bilateral    Cracked lips    Depression    Dermatillomania    Diabetes mellitus (Multi)    Diarrhea    Dry eyes    Dyslipidemia    Excessive daytime sleepiness    Gait instability    Insomnia related to another mental disorder    Irritability    Leukopenia    Moderate intellectual disability with intelligence quotient 35 to 49    Obsessive-compulsive disorder    PMB (postmenopausal bleeding)    Psychosis, affective (CMS-HCC)    Seasonal allergies    Sleep apnea    Hypothyroidism    Thyroid nodule    Tremor    Trisomy 21, 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[facility])    Abdominal hernia    Ventral hernia    Vitamin B deficiency    Vitamin D deficiency    Weight gain    Type 2 diabetes mellitus with other specified complication, without long-term current use of insulin    Incontinence of feces    Dysphagia     Past Medical History:   Diagnosis Date    Acute maxillary sinusitis, unspecified 02/03/2014    Acute maxillary sinusitis     Encounter for gynecological examination (general) (routine) without abnormal findings     Pap smear, as part of routine gynecological examination    Encounter for screening for cardiovascular disorders     Encounter for screening for cardiovascular disorders    Influenza due to other identified influenza virus with other respiratory manifestations 12/29/2014    Influenza A    Otitis media, unspecified, right ear 05/15/2013    Otitis media of right ear    Personal history of other diseases of the respiratory system 12/11/2015    History of acute sinusitis    Personal history of other medical treatment     H/O screening mammography    Unspecified otitis externa, bilateral 05/04/2015    Otitis externa of both ears     Past Surgical History:   Procedure Laterality Date    MOUTH SURGERY  04/28/2014    Oral Surgery Tooth Extraction    OTHER SURGICAL HISTORY  01/11/2014    Vaginal Pap smear    OTHER SURGICAL HISTORY  10/12/2022    Subtotal thyroidectomy     Social History     Tobacco Use    Smoking status: Never    Smokeless tobacco: Never   Substance Use Topics    Alcohol use: Never     family history includes Coronary artery disease in her father; Diabetes in her father; Glaucoma in her father; Hypertension in her father; Lung cancer in her mother.    Current Outpatient Medications:     acetaminophen (Tylenol) 500 mg tablet, Take 1 tablet (500 mg) by mouth every 8 hours if needed for mild pain (1 - 3) or fever (temp greater than 38.0 C)., Disp: 30 tablet, Rfl: 0    ammonium lactate (Amlactin) 12 % cream, Apply to affected area 1-2 times daily as needed, Disp: , Rfl:     blood sugar diagnostic (Accu-Chek Guide test strips) strip, USE 1 TEST STRIP ONCE DAILY, Disp: 100 strip, Rfl: 2    cetirizine (ZyrTEC) 10 mg tablet, TAKE 1 TAB BY MOUTH ONCE DAILY, Disp: 90 tablet, Rfl: 0    ciclopirox (Loprox) 0.77 % cream, , Disp: , Rfl:     cyanocobalamin (Vitamin B-12) 1,000 mcg tablet, TAKE 1 TAB BY MOUTH ONCE DAILY, Disp: 90  tablet, Rfl: 0    dimethicone 1 % ointment, once a day to lips, Disp: , Rfl:     erythromycin (Romycin) 5 mg/gram (0.5 %) ophthalmic ointment, Apply to affected eye(s)., Disp: , Rfl:     fenofibrate (Tricor) 145 mg tablet, TAKE 1 TAB BY MOUTH ONCE DAILY WITH FOOD, Disp: 90 tablet, Rfl: 0    fluticasone (Flonase) 50 mcg/actuation nasal spray, INSTILL 2 SPRAYS IN EACH NOSTRIL ONCE DAILY, Disp: 9.9 mL, Rfl: 10    guaiFENesin (Mucinex) 600 mg 12 hr tablet, Take by mouth., Disp: , Rfl:     Jardiance 10 mg, TAKE 1 TAB BY MOUTH ONCE DAILY, Disp: 30 tablet, Rfl: 10    ketoconazole (NIZOral) 2 % cream, APPLY TROPICALLY TO ABDOMINAL DERMATITIS ONCE DAILY AS NEEDED., Disp: , Rfl:     lancets (Accu-Chek Softclix Lancets) misc, USE ONE LANCET  ONCE DAILY, Disp: 100 each, Rfl: 0    levothyroxine (Synthroid, Levoxyl) 50 mcg tablet, TAKE 1 TAB BY MOUTH EVERY MORNING (TAKE AT LEAST 30MIN PRIOR TO EATING), Disp: 21 tablet, Rfl: 0    melatonin 3 mg tablet, TAKE 2 TABS (6MG) BY MOUTH EVERY DAY AT BEDTIME, Disp: 62 tablet, Rfl: 3    metFORMIN  mg 24 hr tablet, Take 1 tablet (500 mg) by mouth 2 times a day., Disp: 60 tablet, Rfl: 10    OLANZapine (ZyPREXA) 5 mg tablet, Take 1 tablet (5 mg) by mouth once daily at bedtime., Disp: 31 tablet, Rfl: 3    omeprazole (PriLOSEC) 20 mg DR capsule, TAKE 1 CAP BY MOUTH ONCE DAILY, Disp: 90 capsule, Rfl: 0    phenylephrine-DM-acetaminophen (Vicks DayQuil Cold-Flu Relief) 5- mg capsule, Take 2 capsules by mouth every 4 hours if needed for congestion (and cough)., Disp: 30 capsule, Rfl: 1    polyethylene glycol (Glycolax, Miralax) 17 gram/dose powder, Mix 17 g of powder and drink once daily as needed for constipation., Disp: 510 g, Rfl: 1    reggie oil oil, , Disp: , Rfl:     simvastatin (Zocor) 40 mg tablet, TAKE 1 TAB BY MOUTH EVERY DAY AT BEDTIME, Disp: 90 tablet, Rfl: 0    Vanicream cream, , Disp: , Rfl:     Vitamin D2 1,250 mcg (50,000 unit) capsule, TAKE 1 CAP BY MOUTH EVERY WEEK, Disp:  4 capsule, Rfl: 10  No Known Allergies  There were no vitals taken for this visit.  Neurological Exam/Physical Exam:    Constitutional: General appearance: Abnormal Facial features c/w down syndrome   Auscultation of Heart: Regular rate and rhythm, no murmurs, normal S1 and S2.   Carotid Arteries: Intact without any bruits   Peripheral Vascular Exam: Pulses +2 and equal in all extremities.   Mental status: The patient was in no distress, alert, interactive and cooperative. Affect is flat.   Orientation: oriented to person, oriented to place  Memory: recent memory impaired and remote memory impaired.   mild dysarthria.   Eyes: The ophthalmoscopic examination was normal. The fundi are visualized with normal disc margins and without hemorrhages   Cranial nerve II: Visual fields full to confrontation.   Cranial nerves III, IV, and VI: Pupils round, equally reactive to light; no ptosis. EOMs intact. No nystagmus.   Cranial Nerve V: Facial sensation intact bilaterally.   Cranial nerve VII: Normal and symmetric facial strength.   Cranial nerve VIII: Hearing is intact bilaterally to finger rub / whisper.   Cranial nerves IX and X: Palate elevates symmetrically.   Cranial nerve XI: Shoulder shrug and neck rotation strength are intact.   Cranial nerve XII: Tongue midline with normal strength.   Motor:  Muscle strength was 5/5 throughout. no abnormal or adventitious movements were present. Mild resting tremor of RUE seen and is subtle.   Deep Tendon Reflexes: left biceps 2+ , right biceps 2+, left triceps 2+, right triceps 2+, left brachioradialis 2+, right brachioradialis 2+, left patella 2+, right patella 2+, left ankle jerk 1+, right ankle jerk 1+   Plantar Reflex: Toes downgoing to plantar stimulation on the left. Toes downgoing to plantar stimulation on the right.   Sensory Exam: Normal to light touch.   Coordination: There is no limb dystaxia and rapid alternating movements are intact.   Gait: antalgic gait. Mild  shuffling.        Labs:  CBC:   Lab Results   Component Value Date    WBC 2.6 (L) 02/27/2024    WBC 2.6 (L) 02/27/2024    HGB 15.7 02/27/2024    HGB 15.7 02/27/2024    HCT 48.0 (H) 02/27/2024    HCT 48.0 (H) 02/27/2024     02/27/2024     02/27/2024     BMP:   Lab Results   Component Value Date     09/24/2024    K 4.1 09/24/2024     09/24/2024    CO2 27 09/24/2024    BUN 21 09/24/2024    CREATININE 1.02 09/24/2024    CALCIUM 9.2 09/24/2024     LFT:   Lab Results   Component Value Date    ALKPHOS 39 02/27/2024    BILITOT 0.5 02/27/2024    PROT 6.5 02/27/2024    ALBUMIN 3.9 02/27/2024    ALT 13 02/27/2024    AST 24 02/27/2024       Assessment/Plan   Problem List Items Addressed This Visit    None     Pt has Down syndrome, depression, behavioral disorder, hx of hypothyroidism and b12 deficiency.  Right hand tremor at rest   Thyroid issues may be contributing.  Her brain MRI shows asymmetric volume loss of the left hippocampi.  This could represent an early Alzheimer's disease versus higher risk in Down syndrome patients however is very nonspecific.  This could be a cause of her memory loss.  Continue to follow with psychiatry to adjust any medications as needed for behavioral issues.   Will observe and continue to monitor.

## 2025-01-10 ENCOUNTER — HOSPITAL ENCOUNTER (OUTPATIENT)
Dept: RADIOLOGY | Facility: HOSPITAL | Age: 60
Discharge: HOME | End: 2025-01-10
Payer: MEDICARE

## 2025-01-10 DIAGNOSIS — R13.11 ORAL PHASE DYSPHAGIA: ICD-10-CM

## 2025-01-10 PROBLEM — R13.12 OROPHARYNGEAL DYSPHAGIA: Status: ACTIVE | Noted: 2025-01-10

## 2025-01-10 PROCEDURE — 92611 MOTION FLUOROSCOPY/SWALLOW: CPT | Mod: GN | Performed by: STUDENT IN AN ORGANIZED HEALTH CARE EDUCATION/TRAINING PROGRAM

## 2025-01-10 PROCEDURE — 2500000005 HC RX 250 GENERAL PHARMACY W/O HCPCS: Performed by: INTERNAL MEDICINE

## 2025-01-10 PROCEDURE — 74230 X-RAY XM SWLNG FUNCJ C+: CPT

## 2025-01-10 PROCEDURE — 92526 ORAL FUNCTION THERAPY: CPT | Mod: GN | Performed by: STUDENT IN AN ORGANIZED HEALTH CARE EDUCATION/TRAINING PROGRAM

## 2025-01-10 RX ADMIN — BARIUM SULFATE 8 ML: 400 PASTE ORAL at 09:30

## 2025-01-10 RX ADMIN — BARIUM SULFATE 700 MG: 700 TABLET ORAL at 09:35

## 2025-01-10 RX ADMIN — BARIUM SULFATE 110 ML: 0.81 POWDER, FOR SUSPENSION ORAL at 09:10

## 2025-01-10 RX ADMIN — BARIUM SULFATE 5 ML: 400 SUSPENSION ORAL at 09:30

## 2025-01-10 RX ADMIN — BARIUM SULFATE 65 ML: 400 SUSPENSION ORAL at 09:20

## 2025-01-10 NOTE — ADDENDUM NOTE
Encounter addended by: Naima Dalton, SLP on: 1/10/2025 1:55 PM   Actions taken: Charge Capture section accepted

## 2025-01-10 NOTE — PROCEDURES
Outpatient Modified Barium Swallow Study     Patient Name: Ene Grant  MRN: [id number]  : 1965  Today's Date: 01/10/25  Time Calculation  Start Time: 907  Stop Time: 920  Time Calculation (min): 13 min       Modified Barium Swallow Study completed after informed verbal consent. Various liquid and solid consistencies were presented. The anatomic structures and function of the oropharynx, larynx, hypopharynx and cervical esophagus were evaluated.    Naima Dalton MA, CCC-SLP  Phone/Pager: [phone]/Haiku secure chat    SPEECH FINDINGS:   Reason for referral: Assess swallow function  Patient hx: 59y old female referred for an instrumental evaluation following a choking episode. History of DS, MDD, OCD and dementia  Respiratory status: Room air  Current diet: Regular/thin    DIET RECOMMENDATIONS:   - Soft & Bite Sized (IDDSI Level 6)  - Thin liquids (IDDSI Level 0)  STRATEGIES:   -Sit upright 90 degrees for all PO  -Remain upright 20-30 minutes after meals  -Feed/eat at a slow rate  -Close supervision with meals  -No straws  -Small bite/sip  -Alternate liquids/solids  -Liquids via Provale cup or no >15mL volume at a time  -Medications whole with puree    SLP PLAN:  Skilled SLP Services: Skilled SLP intervention for dysphagia is warranted.  SLP Frequency: To be determined by treating SLP in outpatient setting  Treatment/Interventions:   - Compensatory strategy training  - Patient/caregiver education    Discussed POC: Patient  Discussed Risks/Benefits: Yes  Patient/Caregiver Agreeable: Yes    Pain Scale: 0/10, 0-no pain    GOALS: Established 1/10/25  Patient/Caregiver will demonstrate knowledge of taught compensatory strategies and dietary consistency recommendations to optimize functional swallow function without overt clinical signs and symptoms of aspiration or dysphagia    Long term goal: Maintain adequate nutrition and hydration with the least restrictive oral diet with no overt s/s of aspiration or  pulmonary compromise.    Education provided: ST provided education to Patient caregiver regarding MBSS impressions, recommendations and POC at this time. Verbal understanding and agreement given on all accounts.     Treatment Provided today: ST provided extensive education to facility caregiver regarding anatomy/physiology of swallow function with video review, risk of aspiration/aspiration pna, diet modifications, and the use of compensatory swallow strategies to promote pt safety upon PO intake including limiting volumes of liquids and ensuring food is cut into small pieces to minimize choking risk. Recommend follow up OP speech therapy for the patient's sister (POA) for education/training as family will take the patient off site during weekends.     Additional Medical Consults Suggested: - No new disciplines indicated    Mechanics of the swallow summary:  ORAL PHASE:  Lip Closure - No labial escape/anterior loss of bolus   Tongue Control During Bolus Hold - Cohesive bolus between tongue to palatal seal   Bolus prep/mastication - Disorganized mastication with solid pieces of bolus unchewed   Bolus transport/lingual motion - Brisk tongue motion for A-P movement of the bolus   Oral residue - Residue collection on oral structure     PHARYNGEAL PHASE:  Initiation of pharyngeal swallow - Collection of bolus at the level of the pyriform sinus   Soft palate elevation - No bolus between soft palate/pharyngeal wall   Laryngeal elevation - Partial superior movement of thyroid cartilage and/or partial approximation of arytenoids to epiglottic petiole   Anterior hyoid excursion - Complete anterior movement   Epiglottic movement - Complete inversion    Laryngeal vestibule closure - Incomplete - narrow column of air/contrast in laryngeal vestibule   Pharyngeal stripping wave - Present, however, diminished   Pharyngeal contraction (A/P view) - Not tested       Pharyngoesophageal segment opening - Complete distension and complete  "duration/no obstruction of flow of bolus   Tongue base retraction - Trace column of contrast or air between tongue base and pharyngeal wall   Pharyngeal residue - Residue within the valleculae following the swallow     ESOPHAGEAL PHASE:  Esophageal clearance - Not evaluated     SLP Impressions with severity rating:   Pt presents with severe oropharyngeal dysphagia characterized by deficits noted above. The most significant deficits impacting swallowing safety and efficiency include delayed initiation of the swallow, disorganized mastication, incomplete laryngeal closure and decreased base of tongue retraction impacting propulsion of the bolus. The patient demonstrated highly impulsive behaviors with each trial and did not follow commands to complete a bolus hold or stop drinking when instructed to do so. The patient did not adhere to taking a small sip when instructed. Inconsistent SILENT aspiration and delayed cough response was observed with thin and nectar thick liquids via straw and cup. The patient had aspiration with 5mL thin when instructed to do a bolus hold. When given 20mL thin to drink naturally, no penetration or aspiration was observed. When volumes increased, the amount of aspiration also increased. This occurred with both cup and straw. Disorganized mastication resulted in penetration of the solid bolus which ejected from the laryngeal vestibule when cued to cough. Pharyngeal residues were reduced with spontaneous repeat swallow with liquids although residue remained in the vallecula post solid trial.     Per the results of this assessment, patient is appropriate to continue with thin liquid with the initiation of a Provale cup to limit liquid bolus size. Recommend solids be mechanically altered to no greater than 1\" pieces. Patient is at risk for aspiration due to impaired cognition and safety awareness necessitating  close supervision with meals. Patient caregiver educated as such with family " encouraged to follow up in outpatient speech therapy for additional education.     A total score of 3 or above may indicate difficulty with swallowing safely and/or efficiently    Functional Oral Intake Scale  Functional Oral Intake Scale: Level 5        total oral diet with multiple consistencies, but requires special preparations and compensations    Rosenbek's Penetration Aspiration Scale  Thin Liquids: 8. SILENT ASPIRATION - contrast passes glottis, visible residue, NO pt response]   During the Swallow  Nectar Thick Liquids: 8. SILENT ASPIRATION - contrast passes glottis, visible residue, NO pt response]   Honey Thick Liquids: 2. PENETRATION that CLEARS - contrast enter airway, above vocal cords, no residue  Puree: 1. NO ASPIRATION & NO PENETRATION - no aspiration, contrast does not enter airway  Solids: 3. PENETRATION with LOW ASPIRATION risk - contrast remains above vocal cords, visible residue]

## 2025-01-13 DIAGNOSIS — R13.12 OROPHARYNGEAL DYSPHAGIA: ICD-10-CM

## 2025-01-13 DIAGNOSIS — T17.998A ASPIRATION OF LIQUID, INITIAL ENCOUNTER: Primary | ICD-10-CM

## 2025-01-21 ENCOUNTER — APPOINTMENT (OUTPATIENT)
Dept: PRIMARY CARE | Facility: CLINIC | Age: 60
End: 2025-01-21
Payer: MEDICARE

## 2025-02-05 ENCOUNTER — APPOINTMENT (OUTPATIENT)
Dept: BEHAVIORAL HEALTH | Facility: CLINIC | Age: 60
End: 2025-02-05
Payer: MEDICARE

## 2025-02-13 DIAGNOSIS — E11.65 TYPE 2 DIABETES MELLITUS WITH HYPERGLYCEMIA, WITHOUT LONG-TERM CURRENT USE OF INSULIN: ICD-10-CM

## 2025-02-13 DIAGNOSIS — E03.9 HYPOTHYROIDISM, UNSPECIFIED TYPE: ICD-10-CM

## 2025-02-13 RX ORDER — LEVOTHYROXINE SODIUM 50 UG/1
TABLET ORAL
Qty: 27 TABLET | Refills: 10 | Status: SHIPPED | OUTPATIENT
Start: 2025-02-13

## 2025-02-13 RX ORDER — METFORMIN HYDROCHLORIDE 500 MG/1
500 TABLET, EXTENDED RELEASE ORAL 2 TIMES DAILY
Qty: 60 TABLET | Refills: 10 | Status: SHIPPED | OUTPATIENT
Start: 2025-02-13

## 2025-02-26 ENCOUNTER — DOCUMENTATION (OUTPATIENT)
Dept: SPEECH THERAPY | Facility: CLINIC | Age: 60
End: 2025-02-26
Payer: MEDICARE

## 2025-02-26 ENCOUNTER — APPOINTMENT (OUTPATIENT)
Dept: SPEECH THERAPY | Facility: CLINIC | Age: 60
End: 2025-02-26
Payer: MEDICARE

## 2025-02-26 DIAGNOSIS — F39 PSYCHOSIS, AFFECTIVE (CMS-HCC): ICD-10-CM

## 2025-02-26 DIAGNOSIS — F51.05 INSOMNIA RELATED TO ANOTHER MENTAL DISORDER: ICD-10-CM

## 2025-02-26 RX ORDER — TALC
POWDER (GRAM) TOPICAL
Qty: 62 TABLET | Refills: 0 | Status: SHIPPED | OUTPATIENT
Start: 2025-02-26

## 2025-02-26 RX ORDER — OLANZAPINE 5 MG/1
5 TABLET ORAL NIGHTLY
Qty: 31 TABLET | Refills: 0 | Status: SHIPPED | OUTPATIENT
Start: 2025-02-26

## 2025-02-26 NOTE — PROGRESS NOTES
"Speech-Language Pathology                 Therapy Communication Note    Patient Name: Ene Grant  MRN: [id number]  Department:   OP speech therapy  Today's Date: 2/26/2025     Discipline: Speech Language Pathology    Missed Time: Cancel    Comment: Pt cancelled initial evaluation via [facility].  Cancellation noted read, \"Will call to reschedule.\"           "

## 2025-03-04 ENCOUNTER — APPOINTMENT (OUTPATIENT)
Dept: PRIMARY CARE | Facility: CLINIC | Age: 60
End: 2025-03-04
Payer: MEDICARE

## 2025-03-12 ENCOUNTER — APPOINTMENT (OUTPATIENT)
Dept: BEHAVIORAL HEALTH | Facility: CLINIC | Age: 60
End: 2025-03-12
Payer: MEDICARE

## 2025-03-12 VITALS — HEIGHT: 57 IN | BODY MASS INDEX: 24.81 KG/M2 | WEIGHT: 115 LBS | RESPIRATION RATE: 17 BRPM

## 2025-03-12 DIAGNOSIS — Q90.9 DOWN SYNDROME: ICD-10-CM

## 2025-03-12 DIAGNOSIS — F51.05 INSOMNIA RELATED TO ANOTHER MENTAL DISORDER: ICD-10-CM

## 2025-03-12 DIAGNOSIS — F39 PSYCHOSIS, AFFECTIVE (CMS-HCC): Primary | ICD-10-CM

## 2025-03-12 DIAGNOSIS — F03.92 DEMENTIA WITH PSYCHOTIC DISTURBANCE, UNSPECIFIED DEMENTIA SEVERITY, UNSPECIFIED DEMENTIA TYPE (MULTI): ICD-10-CM

## 2025-03-12 PROCEDURE — 1036F TOBACCO NON-USER: CPT | Performed by: NURSE PRACTITIONER

## 2025-03-12 PROCEDURE — 3008F BODY MASS INDEX DOCD: CPT | Performed by: NURSE PRACTITIONER

## 2025-03-12 PROCEDURE — 99215 OFFICE O/P EST HI 40 MIN: CPT | Performed by: NURSE PRACTITIONER

## 2025-03-12 RX ORDER — OLANZAPINE 5 MG/1
5 TABLET ORAL NIGHTLY
Qty: 31 TABLET | Refills: 5 | Status: SHIPPED | OUTPATIENT
Start: 2025-03-12

## 2025-03-12 RX ORDER — TALC
POWDER (GRAM) TOPICAL
Qty: 62 TABLET | Refills: 5 | Status: SHIPPED | OUTPATIENT
Start: 2025-03-12

## 2025-03-12 NOTE — PROGRESS NOTES
ASSESSMENT: Ms. Grant presents with continued memory issues and aspiration with eating, resulting in diet change.   Neurologist diagnosed with dementia.  Guardian is seeking a third opinion. Declines donepezil restart by this Clinician.  After evaluation by specialist, consider switch from olanzapine to quetiapine due to dysphagia.   See treatment plan below.     PLAN:                                                                                                              problems treated   f/u requested to prevent relapse   medications renewed/re-ordered     1.  Continue olanzapine (Zyprexa) 5 mg by mouth at bedtime for psychosis.  2.  Continue melatonin 6 mg by mouth at bedtime for sleep disturbances.    3.  Risks, benefits, alternatives, off-label uses, and side effects of medications have been discussed with patient/caregiver. There is no report of signs/symptoms consistent with medication-induced impairment in daily functioning. At this time, benefits of medication felt to outweigh potential risks. Will continue to reassess need for psychotropic medication at regular 3-6 month intervals.  4. Return to clinic 4 months virtual or earlier if needed. Call [phone] to reschedule.  5. Referral sent to Dr. Aashish Saenz     Thank you for seeing me today.  If you have any questions or concerns, do not hesitate to contact my office.  Nel Prieto     TREATMENT TYPE                                      counseling and coordination of care, addressing signs and symptoms of illness; risks/benefits and side effects of medications; and behavioral approaches to illness.  This note was created using electronic dictation. There may be errors in syntax and meaning. Please contact the office with any questions.   For [facility] residents, [facility] is a 24/7 hotline you can call for assistance [[phone]].   Please call 911 or go to your closest Emergency Room if you feel worse. This includes thoughts of  hurting yourself or anyone else, or having other troubles such as hearing voices, seeing visions, or having new and scary thoughts about the people around you.      Provider Impressions     PRESENT FOR APPOINTMENT  Client   Caregiver  Faheem Griggs, Supervisor, known 2 years  Guerline Cano NP student with consent  Not present: Guardian/Sister Mickie Watson  Virtual or Telephone Consent    An interactive audio and video telecommunication system which permits real time communications between the patient (at the originating site) and provider (at the distant site) was utilized to provide this telehealth service.   Verbal consent was requested and obtained from Ene Grant on this date, 03/12/25 for a telehealth visit and the patient's location was confirmed at the time of the visit.     SUBJECTIVE 59 year-old  single female with a history of moderate ID, psychosis, MDD, OCD, and sleep disturbances and PmHx diabetes, GERD, aortic regurgitation, hyperlipidemia, hypertension, hypothyroidism, ventral hernia, vitamin D, vitamin B12 deficiency, Down syndrome, and obesity presenting for medication management.  Had been living in [facility], but moved to assisted living Oct 2018. Still through [facility].      Last seen Oct 2024. No med changes.    August 2024.  At that time, olanzapine was increased.    Feb 2024. No med changes.  November 2023.  At that time, melatonin was increased.  January July 2022. At that time, Zyprexa was decreased to find the lowest effective dose.  January 2021. At that time, no medication changes.  November 2021. At that time, Olanzapine (Zyprexa) was restarted.  October 2021. At that time, Melatonin was started & Olanzapine was discontinued (Guardian request as stated never saw symptoms of psychosis).  August 2021. At that time, Discontinue Melatonin    May 2021. At that time, Olanzapine was increased. In interim, July 29, 2021, Sertraline was DC'd by Neuro.   February 2021. At  "that time, Rexulti was DC'd and Olanzapine was started.  December 2020. At that time, Rexulti was decreased.  August 2020. At that time, Rexulti was increased.  June 2020. At that time, Rexulti was increased.  November 2019. At that time, Rexulti was increased.  September 2019. At that time, Rexulti was started.  July 2019. At that time, no med changes.  April 2019. At that time, no med changes.  November 2018. At that time, Abilify was started, Zoloft and donepezil were increased. All changes were refused by Guardian.  July 30, 2018. At that time, no med changes.   July 2, 2018 for initial evaluation. At that time, Donepezil and Zoloft were started.      Staff reports increased forgetfulness, not showering or eating  Feb 10, 2025 collapsed, diarrhea & vomiting. ER. Admitted x 1 day for obstruction.  Lost 4# this week.  Jan 2025: Aspiration while drinking= diet change (bite sized, chopped) and prolo cup     works with her overnight and reports that she sleeps during the night.  However, does report an improvement in the morning.  Maira is no longer getting up and down, talking about children that are not there and is able to sit and calmly watch TV until it is time for her to leave.  A.m. blood sugar 99  September 2024 thyroid elevated and Synthroid was decreased.  Maira reports that \"I want to leave with Jesus Manuel\".  She states she does not have anything to talk about with this clinician and that everything is good.  Memory: Staff report rapid progression of memory loss.  She has forgotten her own name at times and her favored staff.  They are very concerned.  Reviewed chart, where she had been on donepezil prior to meeting this clinician and how donepezil was restarted by this clinician at initial evaluation.  However, due to request of guardian, it was discontinued.    Neurologist diagnosed with dementia.  Guardiyoel is in the precontemplation stage with a dementia diagnosis.  She is seeking a third opinion.  " "Therefore, donepezil is on hold.  As discussed previously, starting donepezil will not reverse memory loss, but delay disease process.  No longer having screaming episodes.  History: Sleeping well at night. CPAP was DC\"d in Feb 2020 DT not using.   internal stim- \"little friends\" not problematic. No longer pounding reported in her room dt frustrated with voices. Ct reports that the devil upsets her. Has names for these \"friends.\" She does not like when staff try to talk to these friends.   Day Program: ([facility]) [facility]: M, W, F: she enjoys activities and remains social.   Sister Shwetha takes her home approx monthly.     ENT Dr Femi Bob= Biopsy of Thyroid; Bariatric consult- no changes; PCP increased Metformin.   Resided at [facility]- had to watch movies alone in the basement     While on Olanzapine: No longer cowering in her bedroom.   She is is more social, spending time in the shared living space, participating in crafts.   She continues to enjoy reading her books and coloring.     HX:  1. boundary issues at prior  ([facility]). Especially with male staff. Her new home has mostly female staff and they have not noted any obsessions or boundary issues.  2. OCD: Has set routines. If her ADL's or activities are interrupted, she will refuse to do anything. When this happened, the staff had to call her sister for help, decreased sleep dt reports of trying to place \"Peter\" the child that she sees and hears in a closet.  She is also very methodical/particular in the way she gets dressed/prepared for the day.     MEDICATIONS: Past: Abilify, Aricept, and Amaryl (?= MELLARILL?)  Psy/SI/HI/aggression: +AH that causes her to have head pain.  .   Med Physicians:  PCP: Dr. Micki Matthew  Hematology/Oncology: Dr. Nieves at . referred by Dr. Micki Vences for further evaluation and   management of leukopenia on 02/24/2021.   Neurologist/movement disorders: Dr. Forde at   Cardiologist: Dr." "Beth Verdugo= Dr. Kemi Lewis  Dermatologist: Dr. Madhav Oneil  Podiatrist: Dr. Edwin Wong  Dentist: Dr Anna Joshua  GYN: Dr Joanne Dickey at   ENT Dr Femi Bob= Biopsy of Thyroid  Gastro Dr. Carmenza Ruiz  Pulmonologist: Dr. Neely in [facility]  Otolaryngologist: Dr. Tana Cunningham [facility]  Bariatric : Dr. Chas Jett  Urgent Care Jan 2020 for UTI  Urgent Care Feb 2020 for SOB rt allergies   3/13/19 after staff states tried to get her ready for Day Program & \"She was just out of it.\" DX hypo glucose.   Metformin was decreased & Glimepiride (Amaryl) was DC'd.      ALLERGIES: NKDA     FAMILY HISTORY: Lived with mother prior to      Med SE: none reported  COMPLIANCE: good     EPS: mouth dryness, some finger intertwining, but only noted with self talk. Fine hand tremor noted, which had resolved with the restart of Olanzapine. None with decrease to 2.5 mg.  AIMS negative 10/8/2024     LABS REVIEWED:  September 2024 no CBC/differential  May 2023 in chart  August 2022 in chart  May 2021 in chart  May 2018: reviewed results of CMP, CBC/Diff, GFR, & TSH     EKG    Cardiologist  aortic regurgitation, hypertension  December 2021 in chart QTc 409 MS    History of Present IllnessFamily history: - Psychiatric diagnosis: denies primary relatives with serious mental illness (SA) and/or substance use disorders.   Parents: Mother: Kiera. She lived with her mother and own apartment living before moving into .   Siblings: Sisters: reconnected with in 2018. 2/3 has been talking to weekly.: Shwetha Delgado, States that Kalpesh is not her family.   Personal history: Uncle Toribio, whom lives out of state, is her POA. Her sister is her Guardian.  Birth: Unknown  Development: Down's Syndrome  Abuse: suspected by staff  Orientation  Marriage/partner: none  Alcohol: unknown   Tobacco: unknown  Drugs: unknown  Prescribed: see below  Recreational: enjoys drawing: had 4 pictures:witches hands, baby devil, witches " kevin  Past psychiatric: ct states that she has seen before         Initial Fall Risk Screening:   REGULO has not fallen in the last 6 months.    Tobacco Screening: REGULO does not use tobacco.   Diabetes Evaluation:   HbA1c > or = 7%; < 8%.        Review of Systems     Psychiatric: as noted in HPI.       Constitutional: sleep apnea.   Eyes: vision tested , vision impairment and wears glasses/contacts.   ENT: hearing tested, hearing loss, hearing aid  and dental problems.   Cardiovascular: heart defect.   Respiratory: no wheezing.   Gastrointestinal: diarrhea, but no constipation, no abdominal pain, no nausea, no vomiting and no reflux.   Genitourinary: no incontinence.   Musculoskeletal: moving all extremities well and symmetrical.   Integumentary: no rashes.   Neurological: headache and seizures.   Endocrine:. DM.   Hematologic/Lymphatic: no anemia.     Mental Status Exam     Appearance: well-groomed.   Build:. short stature.   Demeanor: average.   Eye Contact: average.   Motor Activity: average.   Speech: slurred.   Language: impoverished language.   Fund of Knowledge: poor fund of knowledge.   Delusions: As noted in HPI.   Self Harm: None Reported.   Aggressive: None Reported.   Mood: euthymic.   Affect: full.   Orientation: alert.   Manner: cooperative.   Thought process: concrete.   Content of thought: As noted in HPI   Abstract/ Rational Thought: mild impairment   Memory: short-term impaired, long-term impaired.   Behavior: normal motor activity.   Attention/Concentration: normal.   Insight: mild impairment.   Judgement: mild impairment.   Musculoskeletal: normal strength and tone.

## 2025-03-12 NOTE — PATIENT INSTRUCTIONS
1.  Continue olanzapine (Zyprexa) 5 mg by mouth at bedtime for psychosis.  2.  Continue melatonin 6 mg by mouth at bedtime for sleep disturbances.    3.  Risks, benefits, alternatives, off-label uses, and side effects of medications have been discussed with patient/caregiver. There is no report of signs/symptoms consistent with medication-induced impairment in daily functioning. At this time, benefits of medication felt to outweigh potential risks. Will continue to reassess need for psychotropic medication at regular 3-6 month intervals.  4. Return to clinic 4 months virtual or earlier if needed. Call [phone] to reschedule.  5. Referral sent to Dr. Aashish Saenz     Thank you for seeing me today.  If you have any questions or concerns, do not hesitate to contact my office.  Nel Prieto     TREATMENT TYPE                                      counseling and coordination of care, addressing signs and symptoms of illness; risks/benefits and side effects of medications; and behavioral approaches to illness.  This note was created using electronic dictation. There may be errors in syntax and meaning. Please contact the office with any questions.

## 2025-03-13 DIAGNOSIS — K21.9 CHRONIC GERD: ICD-10-CM

## 2025-03-13 DIAGNOSIS — J30.2 SEASONAL ALLERGIES: ICD-10-CM

## 2025-03-13 DIAGNOSIS — E53.9 VITAMIN B DEFICIENCY: ICD-10-CM

## 2025-03-13 DIAGNOSIS — E78.5 DYSLIPIDEMIA: ICD-10-CM

## 2025-03-14 ENCOUNTER — APPOINTMENT (OUTPATIENT)
Dept: PRIMARY CARE | Facility: CLINIC | Age: 60
End: 2025-03-14
Payer: MEDICARE

## 2025-03-14 VITALS
HEART RATE: 78 BPM | WEIGHT: 118.2 LBS | OXYGEN SATURATION: 98 % | BODY MASS INDEX: 25.5 KG/M2 | DIASTOLIC BLOOD PRESSURE: 62 MMHG | SYSTOLIC BLOOD PRESSURE: 90 MMHG | HEIGHT: 57 IN

## 2025-03-14 DIAGNOSIS — B37.2 CANDIDAL DERMATITIS: ICD-10-CM

## 2025-03-14 DIAGNOSIS — Z78.0 MENOPAUSE: ICD-10-CM

## 2025-03-14 DIAGNOSIS — R63.4 WEIGHT LOSS: ICD-10-CM

## 2025-03-14 DIAGNOSIS — E55.9 VITAMIN D DEFICIENCY: ICD-10-CM

## 2025-03-14 DIAGNOSIS — E11.69 TYPE 2 DIABETES MELLITUS WITH OTHER SPECIFIED COMPLICATION, WITHOUT LONG-TERM CURRENT USE OF INSULIN: Primary | ICD-10-CM

## 2025-03-14 DIAGNOSIS — Q90.9 TRISOMY 21, DOWN SYNDROME (HHS-HCC): ICD-10-CM

## 2025-03-14 DIAGNOSIS — Z12.31 ENCOUNTER FOR SCREENING MAMMOGRAM FOR MALIGNANT NEOPLASM OF BREAST: ICD-10-CM

## 2025-03-14 DIAGNOSIS — E11.65 TYPE 2 DIABETES MELLITUS WITH HYPERGLYCEMIA, WITHOUT LONG-TERM CURRENT USE OF INSULIN: ICD-10-CM

## 2025-03-14 DIAGNOSIS — E03.9 HYPOTHYROIDISM, UNSPECIFIED TYPE: ICD-10-CM

## 2025-03-14 DIAGNOSIS — E53.9 VITAMIN B DEFICIENCY: ICD-10-CM

## 2025-03-14 LAB — POC HEMOGLOBIN A1C: 6.3 % (ref 4.2–6.5)

## 2025-03-14 PROCEDURE — 1036F TOBACCO NON-USER: CPT | Performed by: INTERNAL MEDICINE

## 2025-03-14 PROCEDURE — 3074F SYST BP LT 130 MM HG: CPT | Performed by: INTERNAL MEDICINE

## 2025-03-14 PROCEDURE — 83036 HEMOGLOBIN GLYCOSYLATED A1C: CPT | Performed by: INTERNAL MEDICINE

## 2025-03-14 PROCEDURE — 99214 OFFICE O/P EST MOD 30 MIN: CPT | Performed by: INTERNAL MEDICINE

## 2025-03-14 PROCEDURE — 3078F DIAST BP <80 MM HG: CPT | Performed by: INTERNAL MEDICINE

## 2025-03-14 PROCEDURE — G2211 COMPLEX E/M VISIT ADD ON: HCPCS | Performed by: INTERNAL MEDICINE

## 2025-03-14 PROCEDURE — 3008F BODY MASS INDEX DOCD: CPT | Performed by: INTERNAL MEDICINE

## 2025-03-14 RX ORDER — OMEPRAZOLE 20 MG/1
20 CAPSULE, DELAYED RELEASE ORAL
Qty: 30 CAPSULE | Refills: 10 | Status: SHIPPED | OUTPATIENT
Start: 2025-03-14

## 2025-03-14 RX ORDER — CETIRIZINE HYDROCHLORIDE 10 MG/1
10 TABLET ORAL DAILY
Qty: 30 TABLET | Refills: 10 | Status: SHIPPED | OUTPATIENT
Start: 2025-03-14

## 2025-03-14 RX ORDER — FENOFIBRATE 145 MG/1
TABLET, FILM COATED ORAL
Qty: 30 TABLET | Refills: 10 | Status: SHIPPED | OUTPATIENT
Start: 2025-03-14

## 2025-03-14 RX ORDER — LANOLIN ALCOHOL/MO/W.PET/CERES
1000 CREAM (GRAM) TOPICAL DAILY
Qty: 30 TABLET | Refills: 10 | Status: SHIPPED | OUTPATIENT
Start: 2025-03-14

## 2025-03-14 RX ORDER — SIMVASTATIN 40 MG/1
40 TABLET, FILM COATED ORAL NIGHTLY
Qty: 30 TABLET | Refills: 10 | Status: SHIPPED | OUTPATIENT
Start: 2025-03-14

## 2025-03-14 RX ORDER — NYSTATIN 100000 U/G
CREAM TOPICAL 2 TIMES DAILY
Qty: 30 G | Refills: 0 | Status: SHIPPED | OUTPATIENT
Start: 2025-03-14 | End: 2025-03-28

## 2025-03-14 ASSESSMENT — PATIENT HEALTH QUESTIONNAIRE - PHQ9
SUM OF ALL RESPONSES TO PHQ9 QUESTIONS 1 AND 2: 0
2. FEELING DOWN, DEPRESSED OR HOPELESS: NOT AT ALL
1. LITTLE INTEREST OR PLEASURE IN DOING THINGS: NOT AT ALL

## 2025-03-14 NOTE — PATIENT INSTRUCTIONS
Maira is due for blood work. It is fasting    She has a fungal infection in abdominal folds. I want her to use it twice a day for 2 weeks and see me in 2 weeks    She is due for mammogram and bone density    A1c: 6.3

## 2025-03-14 NOTE — PROGRESS NOTES
"Subjective   Patient ID: Ene Grant is a 59 y.o. female who presents for Hospital Follow-up (Patient was seen at [facility] on 2/10/2025  for vomiting and diarrhea. ).    HPI     Here for followup from hospital  Had ER visit for vomiting and diarrhea  All resolved  We do not have records but requested it    She has lost weight  Does not like diet and is picky  She did see neurology and told may be early Alzheimer and to see psychiatry    She has a rash on abdomen  Skipped shower for 1 week  aMira does state it hurts a little    Review of Systems   All other systems reviewed and are negative.      Objective   BP 90/62 (BP Location: Right arm, Patient Position: Sitting, BP Cuff Size: Adult)   Pulse 78   Ht 1.448 m (4' 9\")   Wt 53.6 kg (118 lb 3.2 oz)   SpO2 98%   BMI 25.58 kg/m²     Physical Exam  Constitutional:       Appearance: Normal appearance.      Comments: Down syndrome faces   HENT:      Mouth/Throat:      Mouth: Mucous membranes are dry.      Comments: Macroglossia   Eyes:      Extraocular Movements: Extraocular movements intact.      Pupils: Pupils are equal, round, and reactive to light.   Cardiovascular:      Rate and Rhythm: Normal rate and regular rhythm.      Heart sounds: Normal heart sounds. No murmur heard.     No gallop.   Pulmonary:      Effort: Pulmonary effort is normal. No respiratory distress.      Breath sounds: Normal breath sounds.   Abdominal:      General: There is no distension.      Palpations: Abdomen is soft. There is no mass.      Tenderness: There is no abdominal tenderness.      Hernia: A hernia (ventral hernia, reducible) is present.      Comments: Erythematous with central clearing with satellite lesions in abdominal folds     Musculoskeletal:      Left lower leg: No edema.   Neurological:      Mental Status: She is alert.         Assessment/Plan   Problem List Items Addressed This Visit             ICD-10-CM    Diabetes mellitus (Multi) E11.9    Relevant Orders    " Comprehensive Metabolic Panel    Lipid Panel    Albumin-Creatinine Ratio, Urine Random    POCT glycosylated hemoglobin (Hb A1C) manually resulted (Completed)    CBC    Hypothyroidism E03.9    Relevant Orders    TSH with reflex to Free T4 if abnormal    Trisomy 21, 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HHS-HCC) Q90.9    Vitamin B deficiency E53.9    Relevant Orders    Vitamin B12    Vitamin D deficiency E55.9    Relevant Orders    Vitamin D 25-Hydroxy,Total (for eval of Vitamin D levels)    Type 2 diabetes mellitus with other specified complication, without long-term current use of insulin - Primary E11.69    Relevant Orders    POCT glycosylated hemoglobin (Hb A1C) manually resulted (Completed)    CBC     Other Visit Diagnoses         Codes    Encounter for screening mammogram for malignant neoplasm of breast     Z12.31    Relevant Orders    BI mammo bilateral screening tomosynthesis    Menopause     Z78.0    Relevant Orders    XR DEXA bone density    Candidal dermatitis     B37.2    Relevant Medications    nystatin (Mycostatin) cream    Weight loss     R63.4          Candida dermatitis- nystatin cream BID x 14 days  -recheck 2 weeks    Vomiting and diarrhea- get [facility] records  -had CT     Weight loss-likely from diet  -had CT A/P and MRI  -get records  -may need further workup  -labs     Ventral hernia: saw surgery for this     Tremor: med induced, saw neuro, from zyprexa/rexulti  -resolved     Memory changes- saw neuro  -told could be Alzheimer-early changes     Thyroid nodule: s/p left sided thyroidectomy  -healing well  -TSH WNL 12/22 at ENT at [facility]     Type 2 diabetes: 12/20: 6.0--> 6.8--> 7.1--> 6.8--> 7.7--> 7.6--> 6.6--> 6.1--> 6.4  -Higher doses of metformin cause diarrhea  -On metformin and Jardiance  -saw endo and they agree with our plan (family wanted her to see)  -f/u 3 months     Biscupid aortic valve with AS: seeing Dr. Lewis  -asymptomatic     Leukopenia: saw heme and med induced  -monitor     Hyperlipidemia: on  simvastatin and fenofibrate  -8/22 controlled     Hypothyroidism: on synthroid  -12/22 controlled, getting checked with ENT--has not been checked     Behavioral issues: sees psych  -on zoloft and rexulti     Down syndrome: lives at [facility]     Vitamin b12/D deficiency: WNL 12/20     Headache: prn tylenol  GERD: on PPI     Allergies: on zyrtec and flonase     Return 3 months for A1c. . Stressed importance of followup. Get records. Rash checkup 2 weeks  Eye doc; Dr. Gonzalez  GI: Dr. Ruiz ([facility])  Derm: Dr. Oneil  ENT-- Dr. YESSICA Bob     Health Maintenance  -Pap smear: sees gyn  -Vaccinations: flu shot UTD. Pneumovax booster, shingrix, and tdap  -Mammogram: 1/24---neg, order  -Colonoscopy: 11/9/17-normal, repeat 10 years (Dr. Ruiz)-records received  -DEXA: ordered

## 2025-03-28 ENCOUNTER — APPOINTMENT (OUTPATIENT)
Dept: PRIMARY CARE | Facility: CLINIC | Age: 60
End: 2025-03-28
Payer: MEDICARE

## 2025-03-28 VITALS
BODY MASS INDEX: 25.33 KG/M2 | DIASTOLIC BLOOD PRESSURE: 74 MMHG | OXYGEN SATURATION: 97 % | HEIGHT: 57 IN | SYSTOLIC BLOOD PRESSURE: 106 MMHG | WEIGHT: 117.4 LBS | HEART RATE: 67 BPM

## 2025-03-28 DIAGNOSIS — B37.2 CANDIDAL DERMATITIS: ICD-10-CM

## 2025-03-28 PROCEDURE — 3078F DIAST BP <80 MM HG: CPT | Performed by: INTERNAL MEDICINE

## 2025-03-28 PROCEDURE — 99213 OFFICE O/P EST LOW 20 MIN: CPT | Performed by: INTERNAL MEDICINE

## 2025-03-28 PROCEDURE — 1036F TOBACCO NON-USER: CPT | Performed by: INTERNAL MEDICINE

## 2025-03-28 PROCEDURE — 3008F BODY MASS INDEX DOCD: CPT | Performed by: INTERNAL MEDICINE

## 2025-03-28 PROCEDURE — 3074F SYST BP LT 130 MM HG: CPT | Performed by: INTERNAL MEDICINE

## 2025-03-28 RX ORDER — NYSTATIN 100000 U/G
CREAM TOPICAL 2 TIMES DAILY PRN
Qty: 30 G | Refills: 3 | Status: SHIPPED | OUTPATIENT
Start: 2025-03-28 | End: 2025-04-11

## 2025-03-28 NOTE — PATIENT INSTRUCTIONS
Rash looks good Maira.    Let's do 1 more week of nystatin twice a day and then twice a day as needed if rash returns

## 2025-03-28 NOTE — PROGRESS NOTES
"Subjective   Patient ID: Ene Grant is a 59 y.o. female who presents for Rash (Improving ).    Rash       Here for rash followup  Feels better  Improved per staff    Review of Systems   Skin:  Positive for rash.   All other systems reviewed and are negative.      Objective   /74 (BP Location: Right arm, Patient Position: Sitting, BP Cuff Size: Adult)   Pulse 67   Ht 1.448 m (4' 9\")   Wt 53.3 kg (117 lb 6.4 oz)   SpO2 97%   BMI 25.41 kg/m²     Physical Exam  Constitutional:       Appearance: Normal appearance.      Comments: Down syndrome faces   HENT:      Mouth/Throat:      Comments: Macroglossia   Abdominal:      General: There is no distension.      Palpations: Abdomen is soft. There is no mass.      Tenderness: There is no abdominal tenderness.      Hernia: A hernia (ventral hernia, reducible) is present.      Comments: Resolved rash     Neurological:      Mental Status: She is alert.         Assessment/Plan   Problem List Items Addressed This Visit    None  Visit Diagnoses         Codes    Candidal dermatitis     B37.2    Relevant Medications    nystatin (Mycostatin) cream          Rash-continue x 1 more week  -continue prn     "

## 2025-04-14 ENCOUNTER — APPOINTMENT (OUTPATIENT)
Dept: PRIMARY CARE | Facility: CLINIC | Age: 60
End: 2025-04-14
Payer: MEDICARE

## 2025-04-17 ENCOUNTER — EVALUATION (OUTPATIENT)
Dept: SPEECH THERAPY | Facility: CLINIC | Age: 60
End: 2025-04-17
Payer: MEDICARE

## 2025-04-17 DIAGNOSIS — T17.998A ASPIRATION OF LIQUID, INITIAL ENCOUNTER: ICD-10-CM

## 2025-04-17 DIAGNOSIS — R13.12 OROPHARYNGEAL DYSPHAGIA: ICD-10-CM

## 2025-04-17 PROCEDURE — 92526 ORAL FUNCTION THERAPY: CPT | Mod: GN | Performed by: SPEECH-LANGUAGE PATHOLOGIST

## 2025-04-17 PROCEDURE — 92612 ENDOSCOPY SWALLOW (FEES) VID: CPT | Mod: GN | Performed by: SPEECH-LANGUAGE PATHOLOGIST

## 2025-04-17 ASSESSMENT — PAIN SCALES - GENERAL: PAINLEVEL_OUTOF10: 0 - NO PAIN

## 2025-04-17 ASSESSMENT — PAIN - FUNCTIONAL ASSESSMENT: PAIN_FUNCTIONAL_ASSESSMENT: 0-10

## 2025-04-17 NOTE — LETTER
April 17, 2025    Micki Matthew MD  [address]  [facility]  [place] [address]    Patient: Ene Grant   YOB: 1965   Date of Visit: 4/17/2025       Dear Micki Matthew MD  [address]  [facility], [address]  [place] [address]    The attached plan of care is being sent to you because your patient’s medical reimbursement requires that you certify the plan of care. Your signature is required to allow uninterrupted insurance coverage.      You may indicate your approval by signing below and faxing this form back to us at Dept Fax: [phone].    Please call Dept: [phone] with any questions or concerns.    Thank you for this referral,        Shelly Cortez, SLP  [address] [address]    Payer: Payor: MEDICARE / Plan: MEDICARE PART A AND B / Product Type: *No Product type* /                                                                         Date:     Dear Shelly Cortez, SLP,     Re: Ms. Ene Grant, MRN:[id number]    I certify that I have reviewed the attached plan of care and it is medically necessary for Ms. Ene Grant (1965) who is under my care.          ______________________________________                    _________________  Provider name and credentials                                           Date and time                                                                                      The following plan is in draft form.  Please refer to the current version for the most up-to-date information.                Plan of Care 4/17/25   Effective from: 4/17/2025  Effective to: 7/16/2025    Draft  Plan ID: [id number]               Participants as of 4/17/2025      Name Type Comments Contact Info    Micki Matthew MD PCP - MSSP [facility] Attributed Provider  [phone]    Shelly Cortez SLP Speech Language Pathologist  [phone]          Last Plan Note       Author:  Shelly Cortez, SLP Status: Incomplete Last edited: 2025 10:30 AM         Speech-Language Pathology    Outpatient Speech-Language Pathology Clinical Swallow Evaluation     Patient Name: Ene Grant  MRN: [id number]  : 1965  Today's Date: 25  Time Calculation  Start Time: 1030  Stop Time: 1115  Time Calculation (min): 45 min     Current Problems:  Oropharyngeal Dysphagia    Recommendations:  soft and bite sized; IDDSI level 6 and thin; IDDSI level 0  small single bites, small single sips, pills whole in pureed carrier, alternate solids and liquids, slow rate, and pt be monitored when eating.    SLP Assessment:  Ene Grant currently displays sever oropharyngeal dysphagia.  This is characterized by difficulty with mastication, decreased labial seal with solids, impulsivity, decreased tongue base retraction and intermittent silent and overt aspiration of liquids and penetration of solids related inefficient mastication control and timing of swallow.  Recommended staff and/or family be present to assist with training of strategies.  Made recommendation for a flow reduced straw like Bionix safe swallow strategy to assist with increasing pt's quality of life as pt enjoys drinking from straws occasionally for pleasure.    Staff is following through with recommendations made from MBSS really well at this time.     Functional Oral Intake Scale (FOIS) was administered:  Level 5, total oral diet with multiple consistencies, but requires special preparations and compensations.   A score of 5 or lower may indicate difficulty with swallowing safely and/or efficiently.     Skilled Speech Therapy is medically necessary and ordered by a physician to help Ene Grant learn and utilize strategies and exercises to reduce the risk of aspiration and further prevent pneumonia.  This will also further allow the Ene Grant to maintain good levels of PO intake for overall improved nutrition as well as physical  "and social-emotional health.     Related Medical History:  Down Syndrome, MDD, OCD and dementia.      Pt had a MBSS completed on 1/10/25 with the following impressions:    Pt presents with severe oropharyngeal dysphagia characterized by deficits noted above. The most significant deficits impacting swallowing safety and efficiency include delayed initiation of the swallow, disorganized mastication, incomplete laryngeal closure and decreased base of tongue retraction impacting propulsion of the bolus. The patient demonstrated highly impulsive behaviors with each trial and did not follow commands to complete a bolus hold or stop drinking when instructed to do so. The patient did not adhere to taking a small sip when instructed. Inconsistent SILENT aspiration and delayed cough response was observed with thin and nectar thick liquids via straw and cup. The patient had aspiration with 5mL thin when instructed to do a bolus hold. When given 20mL thin to drink naturally, no penetration or aspiration was observed. When volumes increased, the amount of aspiration also increased. This occurred with both cup and straw. Disorganized mastication resulted in penetration of the solid bolus which ejected from the laryngeal vestibule when cued to cough. Pharyngeal residues were reduced with spontaneous repeat swallow with liquids although residue remained in the vallecula post solid trial.      Per the results of this assessment, patient is appropriate to continue with thin liquid with the initiation of a Provale cup to limit liquid bolus size. Recommend solids be mechanically altered to no greater than 1\" pieces. Patient is at risk for aspiration due to impaired cognition and safety awareness necessitating  close supervision with meals. Patient caregiver educated as such with family encouraged to follow up in outpatient speech therapy for additional education.     SLP Plan:  Ene Grant is recommended to be seen for Skilled " Speech Therapy 1 times per week for 4.    Discussed POC: Patient and Caregiver  Discussed Risks/Benefits: yes, with patient, caregiver  Pt/Caregiver Agreeable: yes  Prognosis: Guarded    Goals:  By discharge Ene Grant will:  LTGs:    Pt will tolerate least restrictive diet without overt s/s of aspiration at 100% independently.     STGs:   1. Pt will complete safe swallow strategies with return demo at 90% with min cues. Establish Date:  4/17/2025      Timeframe: 3 months   Re-Eval date: 7/16/2025       Status: Progressing Comments: GOAL ESTABLISHED THIS DATE.   2. Pt will complete oral exercises with return demo at 90% with min cues. Establish Date:  4/17/2025      Timeframe: 3 months   Re-Eval date: 7/16/2025       Status: Progressing Comments: GOAL ESTABLISHED THIS DATE.  3. Pt will complete pharyngeal exercises with return demo at 90% with min cues. Establish Date:  4/17/2025      Timeframe: 3 months   Re-Eval date: 7/16/2025       Status: Progressing Comments: GOAL ESTABLISHED THIS DATE.  4. Patient will tolerate recommended diet consistencies without overt signs/symptoms of aspiration/penetration.  Establish Date:  4/17/2025      Timeframe: 3 months   Re-Eval date: 7/16/2025       Status: Progressing Comments: GOAL ESTABLISHED THIS DATE.  5.  Education to be provided to staff/family members who are present for session with return demo of teachings at 90% independently.  Establish Date:  4/17/2025      Timeframe: 3 months   Re-Eval date: 7/16/2025       Status: Progressing Comments: GOAL ESTABLISHED THIS DATE.  6. Pt/family education/support to be provided each session.  Establish Date:  4/17/2025      Timeframe: 3 months   Re-Eval date: 7/16/2025       Status: Progressing Comments: GOAL ESTABLISHED THIS DATE.    Barriers: cognition  Strengths: family/caregiver support  There are no spiritual/cultural practices/values/needs that are important to know.   No overt symptoms/signs of abuse/neglect.   Pt/family  prefer to learn via demonstration, printed materials, performance, and explanation/discussion    Subjective:   Ene Grant was seen 1-on-1 with Lesly, her aide from her adult group home, in attendance. Ene Grant participated well throughout the session.  Living Environment: In [facility] in [place]  Patient Arrival: with caregiver  Referred By: Dr. Micki Matthew  Date of Onset: 1/10/25  Reason for Referral: Difficulty with swallowing  Chief Complaint: Having choking episode with steak in the past.   Previous Therapies: no    Baseline Observations:   Respiratory Status:  room air  Hearing: [place]  Vision: functional for feeding and glasses  History of Intubation: no  Behavior/Cognition: cooperative, impulsive, and distractible  Patient Positioning: upright in chair  Baseline Vocal Quality: WFL  Weight Loss: yes and per report of caregiver, pt has lost about 20 pounds over the past 2 years approximately.  Caregiver reports that family is concerned for pt to lose more weight.  Dentition: adequate/natural and some missing teeth    Insurance:  Reviewed: Yes  Number of Authorized Treatments :  90 days  Total Number of Visits:    Certification Period Start Date: 04/17/25 Certification Period End Date: 07/16/25   Certification Period:   Ending:      Pain:  Pain Assessment: 0-10   Pain Score: 0-10 (Numeric) Pain Score: 0 - No pain    Objective:  Consistencies trialed this date: regular/easy to chew; IDDSI level 7, pureed; IDDSI level 4, and thin; IDDSI level 0    Oral Motor:    Pt displayed good ROM for all lingual and labial movements, but was noted to have decreased coordination for all movements.      Laryngeal Assessment:     Oral Phase:   Pt was noted to put multiple bites of solids in her mouth at once and not clear everything prior to next bite.  She was also noted to take several large swallows of liquid at a time.  Caregiver reports that staff have been watching pt closely at meals and  have been providing cues to clear mouth or take a drink prior to next bite and this has been helpful.   Staff has also gotten a  Provale cup and this has also helped pt per caregiver report, as this limits the amount of fluid that pt is able to get at once.      Pharyngeal Phase:   Pt did appear to have decreased laryngeal movement.  Further details above from recent MBSS.    Pt does display silent aspiration with liquids, especially when drinking from straw as well as when drinking consecutive swallows.  She also demonstrated penetration of solids related to decreased coordination between solids and was ejected with a cough, per recent MBSS.    Treatment:   Reviewed results of assessment.  Reviewed images from recent MBSS. Practiced completing effortful swallow x5 and reviewed some food options that would be appropriate for pt and provide greater variety for diet.     Outpatient Education:   Reviewed results of today's assessment.    Reviewed plan for Therapy and Ene Grant was in agreement of this.   Recommend further follow up with physician regarding family concerns for weight loss.

## 2025-04-17 NOTE — PROGRESS NOTES
Speech-Language Pathology    Outpatient Speech-Language Pathology Clinical Swallow Evaluation     Patient Name: Ene Grant  MRN: [id number]  : 1965  Today's Date: 25  Time Calculation  Start Time: 1030  Stop Time: 1115  Time Calculation (min): 45 min     Current Problems:  Oropharyngeal Dysphagia    Recommendations:  soft and bite sized; IDDSI level 6 and thin; IDDSI level 0  small single bites, small single sips, pills whole in pureed carrier, alternate solids and liquids, slow rate, and pt be monitored when eating.    SLP Assessment:  Ene Grant currently displays sever oropharyngeal dysphagia.  This is characterized by difficulty with mastication, decreased labial seal with solids, impulsivity, decreased tongue base retraction and intermittent silent and overt aspiration of liquids and penetration of solids related inefficient mastication control and timing of swallow.  Recommended staff and/or family be present to assist with training of strategies.  Made recommendation for a flow reduced straw like Bionix safe swallow strategy to assist with increasing pt's quality of life as pt enjoys drinking from straws occasionally for pleasure.    Staff is following through with recommendations made from MBSS really well at this time.     Functional Oral Intake Scale (FOIS) was administered:  Level 5, total oral diet with multiple consistencies, but requires special preparations and compensations.   A score of 5 or lower may indicate difficulty with swallowing safely and/or efficiently.     Skilled Speech Therapy is medically necessary and ordered by a physician to help Ene Grant learn and utilize strategies and exercises to reduce the risk of aspiration and further prevent pneumonia.  This will also further allow the Ene Grant to maintain good levels of PO intake for overall improved nutrition as well as physical and social-emotional health.     Related Medical History:  Down Syndrome, MDD,  "OCD and dementia.      Pt had a MBSS completed on 1/10/25 with the following impressions:    Pt presents with severe oropharyngeal dysphagia characterized by deficits noted above. The most significant deficits impacting swallowing safety and efficiency include delayed initiation of the swallow, disorganized mastication, incomplete laryngeal closure and decreased base of tongue retraction impacting propulsion of the bolus. The patient demonstrated highly impulsive behaviors with each trial and did not follow commands to complete a bolus hold or stop drinking when instructed to do so. The patient did not adhere to taking a small sip when instructed. Inconsistent SILENT aspiration and delayed cough response was observed with thin and nectar thick liquids via straw and cup. The patient had aspiration with 5mL thin when instructed to do a bolus hold. When given 20mL thin to drink naturally, no penetration or aspiration was observed. When volumes increased, the amount of aspiration also increased. This occurred with both cup and straw. Disorganized mastication resulted in penetration of the solid bolus which ejected from the laryngeal vestibule when cued to cough. Pharyngeal residues were reduced with spontaneous repeat swallow with liquids although residue remained in the vallecula post solid trial.      Per the results of this assessment, patient is appropriate to continue with thin liquid with the initiation of a Provale cup to limit liquid bolus size. Recommend solids be mechanically altered to no greater than 1\" pieces. Patient is at risk for aspiration due to impaired cognition and safety awareness necessitating  close supervision with meals. Patient caregiver educated as such with family encouraged to follow up in outpatient speech therapy for additional education.     SLP Plan:  Ene YESSICA Terrygold is recommended to be seen for Skilled Speech Therapy 1 times per week for 4.    Discussed POC: Patient and " Caregiver  Discussed Risks/Benefits: yes, with patient, caregiver  Pt/Caregiver Agreeable: yes  Prognosis: Guarded    Goals:  By discharge Ene Grant will:  LTGs:    Pt will tolerate least restrictive diet without overt s/s of aspiration at 100% independently.     STGs:   1. Pt will complete safe swallow strategies with return demo at 90% with min cues. Establish Date:  4/17/2025      Timeframe: 3 months   Re-Eval date: 7/16/2025       Status: Progressing Comments: GOAL ESTABLISHED THIS DATE.   2. Pt will complete oral exercises with return demo at 90% with min cues. Establish Date:  4/17/2025      Timeframe: 3 months   Re-Eval date: 7/16/2025       Status: Progressing Comments: GOAL ESTABLISHED THIS DATE.  3. Pt will complete pharyngeal exercises with return demo at 90% with min cues. Establish Date:  4/17/2025      Timeframe: 3 months   Re-Eval date: 7/16/2025       Status: Progressing Comments: GOAL ESTABLISHED THIS DATE.  4. Patient will tolerate recommended diet consistencies without overt signs/symptoms of aspiration/penetration.  Establish Date:  4/17/2025      Timeframe: 3 months   Re-Eval date: 7/16/2025       Status: Progressing Comments: GOAL ESTABLISHED THIS DATE.  5.  Education to be provided to staff/family members who are present for session with return demo of teachings at 90% independently.  Establish Date:  4/17/2025      Timeframe: 3 months   Re-Eval date: 7/16/2025       Status: Progressing Comments: GOAL ESTABLISHED THIS DATE.  6. Pt/family education/support to be provided each session.  Establish Date:  4/17/2025      Timeframe: 3 months   Re-Eval date: 7/16/2025       Status: Progressing Comments: GOAL ESTABLISHED THIS DATE.    Barriers: cognition  Strengths: family/caregiver support  There are no spiritual/cultural practices/values/needs that are important to know.   No overt symptoms/signs of abuse/neglect.   Pt/family prefer to learn via demonstration, printed materials, performance,  and explanation/discussion    Subjective:   Ene Grant was seen 1-on-1 with Lesly, her aide from her adult group home, in attendance. Ene Grant participated well throughout the session.  Living Environment: In [facility] in [place]  Patient Arrival: with caregiver  Referred By: Dr. Micki Matthew  Date of Onset: 1/10/25  Reason for Referral: Difficulty with swallowing  Chief Complaint: Having choking episode with steak in the past.   Previous Therapies: no    Baseline Observations:   Respiratory Status:  room air  Hearing: [place]  Vision: functional for feeding and glasses  History of Intubation: no  Behavior/Cognition: cooperative, impulsive, and distractible  Patient Positioning: upright in chair  Baseline Vocal Quality: WFL  Weight Loss: yes and per report of caregiver, pt has lost about 20 pounds over the past 2 years approximately.  Caregiver reports that family is concerned for pt to lose more weight.  Dentition: adequate/natural and some missing teeth    Insurance:  Reviewed: Yes  Number of Authorized Treatments :  90 days  Total Number of Visits:    Certification Period Start Date: 04/17/25 Certification Period End Date: 07/16/25   Certification Period:   Ending:      Pain:  Pain Assessment: 0-10   Pain Score: 0-10 (Numeric) Pain Score: 0 - No pain    Objective:  Consistencies trialed this date: regular/easy to chew; IDDSI level 7, pureed; IDDSI level 4, and thin; IDDSI level 0    Oral Motor:    Pt displayed good ROM for all lingual and labial movements, but was noted to have decreased coordination for all movements.      Laryngeal Assessment:     Oral Phase:   Pt was noted to put multiple bites of solids in her mouth at once and not clear everything prior to next bite.  She was also noted to take several large swallows of liquid at a time.  Caregiver reports that staff have been watching pt closely at meals and have been providing cues to clear mouth or take a drink prior to  next bite and this has been helpful.   Staff has also gotten a  Provale cup and this has also helped pt per caregiver report, as this limits the amount of fluid that pt is able to get at once.      Pharyngeal Phase:   Pt did appear to have decreased laryngeal movement.  Further details above from recent MBSS.    Pt does display silent aspiration with liquids, especially when drinking from straw as well as when drinking consecutive swallows.  She also demonstrated penetration of solids related to decreased coordination between solids and was ejected with a cough, per recent MBSS.    Treatment:   Reviewed results of assessment.  Reviewed images from recent MBSS. Practiced completing effortful swallow x5 and reviewed some food options that would be appropriate for pt and provide greater variety for diet.     Outpatient Education:   Reviewed results of today's assessment.    Reviewed plan for Therapy and Ene Grant was in agreement of this.   Recommend further follow up with physician regarding family concerns for weight loss.

## 2025-05-07 ENCOUNTER — APPOINTMENT (OUTPATIENT)
Dept: RADIOLOGY | Facility: CLINIC | Age: 60
End: 2025-05-07
Payer: MEDICARE

## 2025-05-13 ENCOUNTER — APPOINTMENT (OUTPATIENT)
Dept: RADIOLOGY | Facility: CLINIC | Age: 60
End: 2025-05-13
Payer: MEDICARE

## 2025-05-13 DIAGNOSIS — R92.8 ABNORMAL MAMMOGRAM: Primary | ICD-10-CM

## 2025-05-13 DIAGNOSIS — Z12.31 ENCOUNTER FOR SCREENING MAMMOGRAM FOR MALIGNANT NEOPLASM OF BREAST: ICD-10-CM

## 2025-05-13 PROCEDURE — 77067 SCR MAMMO BI INCL CAD: CPT | Performed by: RADIOLOGY

## 2025-05-13 PROCEDURE — 77067 SCR MAMMO BI INCL CAD: CPT

## 2025-05-13 PROCEDURE — 77063 BREAST TOMOSYNTHESIS BI: CPT | Performed by: RADIOLOGY

## 2025-06-03 ENCOUNTER — APPOINTMENT (OUTPATIENT)
Dept: NEUROLOGY | Facility: CLINIC | Age: 60
End: 2025-06-03
Payer: MEDICARE

## 2025-06-03 VITALS
WEIGHT: 118 LBS | HEART RATE: 55 BPM | SYSTOLIC BLOOD PRESSURE: 107 MMHG | DIASTOLIC BLOOD PRESSURE: 59 MMHG | RESPIRATION RATE: 9 BRPM | HEIGHT: 57 IN | BODY MASS INDEX: 25.46 KG/M2 | TEMPERATURE: 97.7 F

## 2025-06-03 DIAGNOSIS — G30.9 MODERATE ALZHEIMER'S DEMENTIA WITH MOOD DISTURBANCE, UNSPECIFIED TIMING OF DEMENTIA ONSET (MULTI): Primary | ICD-10-CM

## 2025-06-03 DIAGNOSIS — F02.B3 MODERATE ALZHEIMER'S DEMENTIA WITH MOOD DISTURBANCE, UNSPECIFIED TIMING OF DEMENTIA ONSET (MULTI): Primary | ICD-10-CM

## 2025-06-03 PROCEDURE — G8433 SCR FOR DEP NOT CPT DOC RSN: HCPCS | Performed by: PSYCHIATRY & NEUROLOGY

## 2025-06-03 PROCEDURE — 3078F DIAST BP <80 MM HG: CPT | Performed by: PSYCHIATRY & NEUROLOGY

## 2025-06-03 PROCEDURE — 3074F SYST BP LT 130 MM HG: CPT | Performed by: PSYCHIATRY & NEUROLOGY

## 2025-06-03 PROCEDURE — 99214 OFFICE O/P EST MOD 30 MIN: CPT | Performed by: PSYCHIATRY & NEUROLOGY

## 2025-06-03 PROCEDURE — 3008F BODY MASS INDEX DOCD: CPT | Performed by: PSYCHIATRY & NEUROLOGY

## 2025-06-03 PROCEDURE — G2211 COMPLEX E/M VISIT ADD ON: HCPCS | Performed by: PSYCHIATRY & NEUROLOGY

## 2025-06-03 PROCEDURE — 1036F TOBACCO NON-USER: CPT | Performed by: PSYCHIATRY & NEUROLOGY

## 2025-06-03 PROCEDURE — 3044F HG A1C LEVEL LT 7.0%: CPT | Performed by: PSYCHIATRY & NEUROLOGY

## 2025-06-03 RX ORDER — MEMANTINE HYDROCHLORIDE 10 MG/1
TABLET ORAL
Qty: 240 TABLET | Refills: 3 | Status: SHIPPED | OUTPATIENT
Start: 2025-06-03

## 2025-06-03 ASSESSMENT — ENCOUNTER SYMPTOMS
OCCASIONAL FEELINGS OF UNSTEADINESS: 1
DEPRESSION: 0
TREMORS: 1
LOSS OF SENSATION IN FEET: 0

## 2025-06-03 ASSESSMENT — PAIN SCALES - GENERAL: PAINLEVEL_OUTOF10: 0-NO PAIN

## 2025-06-03 NOTE — PROGRESS NOTES
Subjective     Ene Grant is a 59 y.o. year old female here for memory loss /dementia follow-up.  Here with an aide from [facility].    Tremor  MRI brain March 27, 2024 which did show asymmetric volume loss on the left hippocampi.  Nonspecific.  She is here with an aide who has notes to review from who she lives with.   She has a history of Down syndrome, MDD, psychosis, OCD and sleep issues.  She is understanding less now.  She has perseverance, asking same questions.  She does not like to go in the shower.   Right hand tremor is occasional, new.   She has had urinary incontinence.     Patient follows with psychiatry , Nel Townsend - on Zyprexa 5mg  Hx of vit b12 def and hypothyroidism.     Blood work was reviewed from September 2024.  Normal kidney functions.  Normal creatinine.  Her TSH was low at 0.2 and free thyroxine was elevated.    Review of Systems   Neurological:  Positive for tremors.     Denies syncope, denies HA.  Denies vision changes.  Denies numbness/tingling.  Denies chest pain or fatigue.    Patient Active Problem List   Diagnosis    Bicuspid aortic valve    Bunion    Chronic GERD    Conductive hearing loss, bilateral    Cracked lips    Depression    Dermatillomania    Diabetes mellitus (Multi)    Diarrhea    Dry eyes    Dyslipidemia    Excessive daytime sleepiness    Gait instability    Insomnia related to another mental disorder    Irritability    Leukopenia    Moderate intellectual disability with intelligence quotient 35 to 49    Obsessive-compulsive disorder    PMB (postmenopausal bleeding)    Psychosis, affective    Seasonal allergies    Sleep apnea    Hypothyroidism    Thyroid nodule    Tremor    Trisomy 21, 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[facility])    Abdominal hernia    Ventral hernia    Vitamin B deficiency    Vitamin D deficiency    Weight gain    Type 2 diabetes mellitus with other specified complication, without long-term current use of insulin    Incontinence of feces    Dysphagia     Oropharyngeal dysphagia     Past Medical History:   Diagnosis Date    Acute maxillary sinusitis, unspecified 02/03/2014    Acute maxillary sinusitis    Encounter for gynecological examination (general) (routine) without abnormal findings     Pap smear, as part of routine gynecological examination    Encounter for screening for cardiovascular disorders     Encounter for screening for cardiovascular disorders    Influenza due to other identified influenza virus with other respiratory manifestations 12/29/2014    Influenza A    Otitis media, unspecified, right ear 05/15/2013    Otitis media of right ear    Personal history of other diseases of the respiratory system 12/11/2015    History of acute sinusitis    Personal history of other medical treatment     H/O screening mammography    Unspecified otitis externa, bilateral 05/04/2015    Otitis externa of both ears     Past Surgical History:   Procedure Laterality Date    MOUTH SURGERY  04/28/2014    Oral Surgery Tooth Extraction    OTHER SURGICAL HISTORY  01/11/2014    Vaginal Pap smear    OTHER SURGICAL HISTORY  10/12/2022    Subtotal thyroidectomy     Social History     Tobacco Use    Smoking status: Never    Smokeless tobacco: Never   Substance Use Topics    Alcohol use: Never     family history includes Coronary artery disease in her father; Diabetes in her father; Glaucoma in her father; Hypertension in her father; Lung cancer in her mother.    Current Outpatient Medications:     acetaminophen (Tylenol) 500 mg tablet, Take 1 tablet (500 mg) by mouth every 8 hours if needed for mild pain (1 - 3) or fever (temp greater than 38.0 C)., Disp: 30 tablet, Rfl: 0    ammonium lactate (Amlactin) 12 % cream, Apply to affected area 1-2 times daily as needed, Disp: , Rfl:     blood sugar diagnostic (Accu-Chek Guide test strips) strip, USE 1 TEST STRIP ONCE DAILY, Disp: 100 strip, Rfl: 2    cetirizine (ZyrTEC) 10 mg tablet, TAKE 1 TAB BY MOUTH ONCE DAILY, Disp: 30 tablet, Rfl:  "10    ciclopirox (Loprox) 0.77 % cream, , Disp: , Rfl:     cyanocobalamin (Vitamin B-12) 1,000 mcg tablet, TAKE 1 TAB BY MOUTH ONCE DAILY, Disp: 30 tablet, Rfl: 10    dimethicone 1 % ointment, once a day to lips, Disp: , Rfl:     erythromycin (Romycin) 5 mg/gram (0.5 %) ophthalmic ointment, Apply to affected eye(s)., Disp: , Rfl:     fenofibrate (Tricor) 145 mg tablet, TAKE 1 TAB BY MOUTH ONCE DAILY WITH FOOD, Disp: 30 tablet, Rfl: 10    fluticasone (Flonase) 50 mcg/actuation nasal spray, INSTILL 2 SPRAYS IN EACH NOSTRIL ONCE DAILY, Disp: 9.9 mL, Rfl: 10    Jardiance 10 mg, TAKE 1 TAB BY MOUTH ONCE DAILY, Disp: 30 tablet, Rfl: 10    ketoconazole (NIZOral) 2 % cream, APPLY TROPICALLY TO ABDOMINAL DERMATITIS ONCE DAILY AS NEEDED., Disp: , Rfl:     lancets (Accu-Chek Softclix Lancets) misc, USE ONE LANCET  ONCE DAILY, Disp: 100 each, Rfl: 0    levothyroxine (Synthroid, Levoxyl) 50 mcg tablet, TAKE 1 TAB BY MOUTH EVERY MORNING (TAKE AT LEAST 30MIN PRIOR TO EATING), Disp: 27 tablet, Rfl: 10    melatonin 3 mg tablet, TAKE 2 TABS (6MG) BY MOUTH EVERY DAY AT BEDTIME, Disp: 62 tablet, Rfl: 5    metFORMIN  mg 24 hr tablet, TAKE 1 TAB BY MOUTH TWICE DAILY, Disp: 60 tablet, Rfl: 10    OLANZapine (ZyPREXA) 5 mg tablet, Take 1 tablet (5 mg) by mouth once daily at bedtime., Disp: 31 tablet, Rfl: 5    omeprazole (PriLOSEC) 20 mg DR capsule, TAKE 1 CAP BY MOUTH ONCE DAILY, Disp: 30 capsule, Rfl: 10    reggie oil oil, , Disp: , Rfl:     simvastatin (Zocor) 40 mg tablet, TAKE 1 TAB BY MOUTH EVERY DAY AT BEDTIME, Disp: 30 tablet, Rfl: 10    Vanicream cream, , Disp: , Rfl:     Vitamin D2 1,250 mcg (50,000 unit) capsule, TAKE 1 CAP BY MOUTH EVERY WEEK, Disp: 4 capsule, Rfl: 10  No Known Allergies  /59 (BP Location: Right arm, Patient Position: Sitting)   Pulse 55   Temp 36.5 °C (97.7 °F)   Resp (!) 9   Ht 1.448 m (4' 9\")   Wt 53.5 kg (118 lb)   BMI 25.53 kg/m²   Neurological Exam/Physical Exam:    Constitutional: General " appearance: Abnormal Facial features c/w down syndrome   Auscultation of Heart: Regular rate and rhythm, no murmurs, normal S1 and S2.   Carotid Arteries: Intact without any bruits   Peripheral Vascular Exam: Pulses +2 and equal in all extremities.   Mental status: The patient was in no distress, alert, interactive and cooperative. Affect is flat.   Memory: recent memory impaired and remote memory impaired.   mild dysarthria.     Cranial nerve II: Visual fields full to confrontation.   Cranial nerves III, IV, and VI: Pupils round, equally reactive to light; no ptosis. EOMs intact. No nystagmus.   Cranial Nerve V: Facial sensation intact bilaterally.   Cranial nerve VII: Normal and symmetric facial strength.   Cranial nerve VIII: Hearing is intact bilaterally to finger rub / whisper.   Cranial nerves IX and X: Palate elevates symmetrically.   Cranial nerve XI: Shoulder shrug and neck rotation strength are intact.   Cranial nerve XII: Tongue midline with normal strength.   Motor:  Muscle strength was 5/5 throughout. no abnormal or adventitious movements were present.  No tremors.   Deep Tendon Reflexes: left biceps 2+ , right biceps 2+, left triceps 2+, right triceps 2+, left brachioradialis 2+, right brachioradialis 2+, left patella 2+, right patella 2+, left ankle jerk 1+, right ankle jerk 1+   Sensory Exam: Normal to light touch.   Coordination: There is no limb dystaxia  Gait: antalgic gait. Mild shuffling, unsteady.        Labs:  CBC:   Lab Results   Component Value Date    WBC 2.6 (L) 02/27/2024    WBC 2.6 (L) 02/27/2024    HGB 15.7 02/27/2024    HGB 15.7 02/27/2024    HCT 48.0 (H) 02/27/2024    HCT 48.0 (H) 02/27/2024     02/27/2024     02/27/2024     BMP:   Lab Results   Component Value Date     09/24/2024    K 4.1 09/24/2024     09/24/2024    CO2 27 09/24/2024    BUN 21 09/24/2024    CREATININE 1.02 09/24/2024    CALCIUM 9.2 09/24/2024     LFT:   Lab Results   Component Value Date     ALKPHOS 39 02/27/2024    BILITOT 0.5 02/27/2024    PROT 6.5 02/27/2024    ALBUMIN 3.9 02/27/2024    ALT 13 02/27/2024    AST 24 02/27/2024       Assessment/Plan   Problem List Items Addressed This Visit    None     Pt has Down syndrome, depression, behavioral disorder, hx of hypothyroidism and b12 deficiency.  Right hand tremor at rest   Thyroid issues may be contributing.  Her brain MRI shows asymmetric volume loss of the left hippocampi.  This could represent an early Alzheimer's disease versus higher risk in Down syndrome patients however is very nonspecific.    Continue to follow with psychiatry to adjust any medications as needed for behavioral issues.   Will observe and continue to monitor.

## 2025-06-03 NOTE — PATIENT INSTRUCTIONS
"It was a pleasure seeing you today.     Please make a follow up appointment in 6 months.  You may also schedule a phone or virtual visit sooner on a Friday morning with me as needed before the next clinic appointment.     For any urgent issues or needing to speak to a medical assistant please call [phone], option 6 during our office hours Monday-Friday 8am-4pm, and leave a voicemail with your concern.  My office will try to reach back you as soon as possible within 24 (business) hours.  If you have an emergency please call 911 or visit a local urgent care or nearest emergency room.      Please understand that FANCRU is a useful communication tool for simple \"normal\" results or a refill request but I would not recommend using this tool for emergent or urgent issues or for conversations with me.  I am happy to ask my staff to rearrange a follow up visit or a virtual visit sooner than requested if appropriate for your care.    "

## 2025-07-10 ENCOUNTER — OFFICE VISIT (OUTPATIENT)
Dept: BEHAVIORAL HEALTH | Facility: CLINIC | Age: 60
End: 2025-07-10
Payer: MEDICARE

## 2025-07-10 ENCOUNTER — APPOINTMENT (OUTPATIENT)
Dept: BEHAVIORAL HEALTH | Facility: CLINIC | Age: 60
End: 2025-07-10
Payer: MEDICARE

## 2025-07-10 VITALS — WEIGHT: 115 LBS | HEIGHT: 57 IN | BODY MASS INDEX: 24.81 KG/M2 | RESPIRATION RATE: 18 BRPM

## 2025-07-10 DIAGNOSIS — F39: Primary | ICD-10-CM

## 2025-07-10 DIAGNOSIS — F51.05 INSOMNIA RELATED TO ANOTHER MENTAL DISORDER: ICD-10-CM

## 2025-07-10 DIAGNOSIS — F03.92 DEMENTIA WITH PSYCHOTIC DISTURBANCE, UNSPECIFIED DEMENTIA SEVERITY, UNSPECIFIED DEMENTIA TYPE (MULTI): ICD-10-CM

## 2025-07-10 PROCEDURE — 99214 OFFICE O/P EST MOD 30 MIN: CPT | Performed by: NURSE PRACTITIONER

## 2025-07-10 PROCEDURE — 3008F BODY MASS INDEX DOCD: CPT | Performed by: NURSE PRACTITIONER

## 2025-07-10 PROCEDURE — 3044F HG A1C LEVEL LT 7.0%: CPT | Performed by: NURSE PRACTITIONER

## 2025-07-10 RX ORDER — OLANZAPINE 5 MG/1
5 TABLET, FILM COATED ORAL NIGHTLY
Qty: 31 TABLET | Refills: 5 | Status: SHIPPED | OUTPATIENT
Start: 2025-07-10

## 2025-07-10 RX ORDER — TALC
POWDER (GRAM) TOPICAL
Qty: 62 TABLET | Refills: 5 | Status: SHIPPED | OUTPATIENT
Start: 2025-07-10

## 2025-07-10 ASSESSMENT — ABNORMAL INVOLUNTARY MOVEMENT SCALE (AIMS)
LOWER_BODY_EXTREMITIES: NONE, NORMAL
LIPS_PARIETAL: NONE, NORMAL
UPPER_BODY_EXTREMITIES: NONE, NORMAL
PATIENT_WEARS_DENTURES: NO
AIMS_SEVERITY: 0
FACIAL_EXPRESSION_MUSCLES: NONE, NORMAL
AIMS_PATIENT_INCAPACITATION: NONE, NORMAL
TONGUE: NONE, NORMAL
NECK_SHOULDER_HIPS: NONE, NORMAL
JAW: NONE, NORMAL
CURRENT_PROBLEMS_TEETH_DENTURES: NO
AIMS_PATIENT_AWARENESS: NO AWARENESS

## 2025-07-10 ASSESSMENT — PATIENT HEALTH QUESTIONNAIRE - PHQ9
5. POOR APPETITE OR OVEREATING: NOT AT ALL
9. THOUGHTS THAT YOU WOULD BE BETTER OFF DEAD, OR OF HURTING YOURSELF: NOT AT ALL
3. TROUBLE FALLING OR STAYING ASLEEP OR SLEEPING TOO MUCH: NEARLY EVERY DAY
8. MOVING OR SPEAKING SO SLOWLY THAT OTHER PEOPLE COULD HAVE NOTICED. OR THE OPPOSITE, BEING SO FIGETY OR RESTLESS THAT YOU HAVE BEEN MOVING AROUND A LOT MORE THAN USUAL: NOT AT ALL
2. FEELING DOWN, DEPRESSED OR HOPELESS: NOT AT ALL
6. FEELING BAD ABOUT YOURSELF - OR THAT YOU ARE A FAILURE OR HAVE LET YOURSELF OR YOUR FAMILY DOWN: NOT AT ALL
4. FEELING TIRED OR HAVING LITTLE ENERGY: SEVERAL DAYS
1. LITTLE INTEREST OR PLEASURE IN DOING THINGS: NOT AT ALL
7. TROUBLE CONCENTRATING ON THINGS, SUCH AS READING THE NEWSPAPER OR WATCHING TELEVISION: MORE THAN HALF THE DAYS

## 2025-07-10 NOTE — PROGRESS NOTES
ASSESSMENT: Ms. Grant presents with dementia being treated with memantine.  Jan 2025 had an episode of aspiration with eating, resulting in diet change.   After evaluation by specialist, consider switch from olanzapine to quetiapine or vraylar due to dysphagia.   See treatment plan below.     PLAN:                                                                                                              problems treated   f/u requested to prevent relapse   medications renewed/re-ordered     1.  Continue olanzapine (Zyprexa) 5 mg by mouth at bedtime for psychosis.  2.  Continue melatonin 6 mg by mouth at bedtime for sleep disturbances.    3.  Risks, benefits, alternatives, off-label uses, and side effects of medications have been discussed with patient/caregiver. There is no report of signs/symptoms consistent with medication-induced impairment in daily functioning. At this time, benefits of medication felt to outweigh potential risks. Will continue to reassess need for psychotropic medication at regular 3-6 month intervals.  4. Return to clinic Monday Nov 3, 2025 at 11 AM virtual or earlier if needed. Call [phone] to reschedule.  5. Referral sent to Dr. Aashish Saenz apt 10/27/25    Thank you for seeing me today.  If you have any questions or concerns, do not hesitate to contact my office.  Nel Prieto     TREATMENT TYPE                                      counseling and coordination of care, addressing signs and symptoms of illness; risks/benefits and side effects of medications; and behavioral approaches to illness.  This note was created using electronic dictation. There may be errors in syntax and meaning. Please contact the office with any questions.   For [facility] residents, [employer] is a 24/7 hotline you can call for assistance [[phone]].   Please call 911 or go to your closest Emergency Room if you feel worse. This includes thoughts of hurting yourself or anyone else, or  having other troubles such as hearing voices, seeing visions, or having new and scary thoughts about the people around you.      Provider Impressions     PRESENT FOR APPOINTMENT  Client   Caregiver  Faheem Anson, Supervisor, known 2 years    Not present: Guardian/Sister Mickie Watson  Virtual or Telephone Consent    An interactive audio and video telecommunication system which permits real time communications between the patient (at the originating site) and provider (at the distant site) was utilized to provide this telehealth service.   Verbal consent was requested and obtained from Ene Grant on this date, 07/10/25 for a telehealth visit and the patient's location was confirmed at the time of the visit.     SUBJECTIVE 59 year-old  single female with a history of moderate ID, psychosis, MDD, OCD, and sleep disturbances and PmHx diabetes, GERD, aortic regurgitation, hyperlipidemia, hypertension, hypothyroidism, ventral hernia, vitamin D, vitamin B12 deficiency, Down syndrome, and obesity presenting for medication management.  Had been living in [facility], but moved to assisted living Oct 2018. Still through [facility].      Last seen March 2025.  At that time, no medication change.  Oct 2024. No med changes.  August 2024.  At that time, olanzapine was increased.    Feb 2024. No med changes.  November 2023.  At that time, melatonin was increased.  January July 2022. At that time, Zyprexa was decreased to find the lowest effective dose.  January 2021. At that time, no medication changes.  November 2021. At that time, Olanzapine (Zyprexa) was restarted.  October 2021. At that time, Melatonin was started & Olanzapine was discontinued (Guardian request as stated never saw symptoms of psychosis).  August 2021. At that time, Discontinue Melatonin    May 2021. At that time, Olanzapine was increased. In interim, July 29, 2021, Sertraline was DC'd by Neuro.   February 2021. At that time, Rexulti was DC'd and  Olanzapine was started.  December 2020. At that time, Rexulti was decreased.  August 2020. At that time, Rexulti was increased.  June 2020. At that time, Rexulti was increased.  November 2019. At that time, Rexulti was increased.  September 2019. At that time, Rexulti was started.  July 2019. At that time, no med changes.  April 2019. At that time, no med changes.  November 2018. At that time, Abilify was started, Zoloft and donepezil were increased. All changes were refused by Guardian.  July 30, 2018. At that time, no med changes.   July 2, 2018 for initial evaluation. At that time, Donepezil and Zoloft were started.      Staff reports increased forgetfulness, not showering or eating, hitting, kicking and biting staff. Mostly to her favored staff.  Memantine 10 mg bid started since last seen.  Dry mouth and rash under breasts, increased sleep and missing day program.  7/5/25 fell and scraped her knee. When wearing shorts, will continue to pick at the scab.  Feb 10, 2025 collapsed, diarrhea & vomiting. ER. Admitted x 1 day for obstruction.  Jan 2025: Aspiration while drinking= diet change (bite sized, chopped) and prolo cup   7/10/25 PHQ-9 score 7 due to sleeping too much, decreased energy and concentration.  no longer getting up and down, talking about children that are not there September 2024 thyroid elevated and Synthroid was decreased.    Day Program: ([facility]) [facility]: M, W, F: she enjoys activities and remains social.   Sister Shwetha takes her home approx monthly.     ENT Dr Femi Bob= Biopsy of Thyroid; Bariatric consult- no changes; PCP increased Metformin.   [place] at [facility] [place]- had to watch movies alone in the basement    She continues to enjoy reading her books and coloring.     HX:  1. boundary issues at prior  ([facility]). Especially with male staff. Her new home has mostly female staff and they have not noted any obsessions or boundary issues.  2. OCD: Has set  "routines. If her ADL's or activities are interrupted, she will refuse to do anything. When this happened, the staff had to call her sister for help, decreased sleep dt reports of trying to place \"Peter\" the child that she sees and hears in a closet.  She is also very methodical/particular in the way she gets dressed/prepared for the day.  3.  internal stim- \"little friends\" not problematic. No longer pounding reported in her room dt frustrated with voices. Ct reports that the devil upsets her. Has names for these \"friends.\" She does not like when staff try to talk to these friends.   4. While on Olanzapine: No longer cowering in her bedroom.     MEDICATIONS: Past: Abilify, Aricept, and Amaryl (?= MELLARILL?)  Psy/SI/HI/aggression: +AH that causes her to have head pain.  .   Med Physicians:  PCP: Dr. Micki Matthew  Hematology/Oncology: Dr. Nieves at . referred by Dr. Micki Vences for further evaluation and   management of leukopenia on 02/24/2021.   Neurologist/movement disorders: Dr. Forde at   Cardiologist: Dr. Beth Verdugo= Dr. Kemi Lewis  Dermatologist: Dr. Madhav Oneil  Podiatrist: Dr. Edwin Wong  Dentist: Dr Anna Joshua  GYN: Dr Joanne Dickey at   ENT Dr Femi Bob= Biopsy of Thyroid  Gastro Dr. Carmenza Ruiz  Pulmonologist: Dr. Neely in [facility]  Otolaryngologist: Dr. Tana Cunningham [facility]  Bariatric : Dr. Chas Jett  Urgent Care Jan 2020 for UTI  Urgent Care Feb 2020 for SOB rt allergies   3/13/19 after staff states tried to get her ready for Day Program & \"She was just out of it.\" DX hypo glucose.   Metformin was decreased & Glimepiride (Amaryl) was DC'd.      ALLERGIES: NKDA     FAMILY HISTORY: Lived with mother prior to [facility]     Med SE: none reported  COMPLIANCE: good     EPS: mouth dryness, some finger intertwining, but only noted with self talk. Fine hand tremor noted, which had resolved with the restart of Olanzapine.   AIMS negative 7/10/25     LABS REVIEWED:  September " 2024 no CBC/differential  May 2023 in chart  August 2022 in chart  May 2021 in chart  May 2018: reviewed results of CMP, CBC/Diff, GFR, & TSH     EKG    Cardiologist  aortic regurgitation, hypertension  April 2024 December 2021 in chart QTc 409 MS    History of Present IllnessFamily history: - Psychiatric diagnosis: denies primary relatives with serious mental illness (SA) and/or substance use disorders.   Parents: Mother: Kiera. She lived with her mother and own apartment living before moving into .   Siblings: Sisters: reconnected with in 2018. 2/3 has been talking to weekly.: Shwetha Delgado, States that Kalpesh is not her family.   Personal history: Uncle Toribio, whom lives out of [facility], is her POA. Her sister is her Guardian.  Birth: Unknown  Development: Down's Syndrome  Abuse: suspected by staff  Orientation  Marriage/partner: none  Alcohol: unknown   Tobacco: unknown  Drugs: unknown  Prescribed: see below  Recreational: enjoys drawing: had 4 pictures:witches hands, baby devil, witches broom  Past psychiatric: ct states that she has seen before         Initial Fall Risk Screening:   REGULO has not fallen in the last 6 months.    Tobacco Screening: REGULO does not use tobacco.   Diabetes Evaluation:   HbA1c > or = 7%; < 8%.        Review of Systems     Psychiatric: as noted in HPI.       Constitutional: sleep apnea.   Eyes: vision tested , vision impairment and wears glasses/contacts.   ENT: hearing tested, hearing loss, hearing aid  and dental problems.   Cardiovascular: heart defect.   Respiratory: no wheezing.   Gastrointestinal: diarrhea, but no constipation, no abdominal pain, no nausea, no vomiting and no reflux.   Genitourinary: no incontinence.   Musculoskeletal: moving all extremities well and symmetrical.   Integumentary: no rashes.   Neurological: headache and seizures.   Endocrine:. DM.   Hematologic/Lymphatic: no anemia.     Mental Status Exam     Appearance: well-groomed.   Build:. short stature.    Demeanor: average.   Eye Contact: average.   Motor Activity: average.   Speech: slurred.   Language: impoverished language.   Fund of Knowledge: poor fund of knowledge.   Delusions: As noted in HPI.   Self Harm: None Reported.   Aggressive: None Reported.   Mood: euthymic.   Affect: full.   Orientation: alert.   Manner: cooperative.   Thought process: concrete.   Content of thought: As noted in HPI   Abstract/ Rational Thought: mild impairment   Memory: short-term impaired, long-term impaired.   Behavior: normal motor activity.   Attention/Concentration: normal.   Insight: mild impairment.   Judgement: mild impairment.   Musculoskeletal: normal strength and tone.

## 2025-07-10 NOTE — PATIENT INSTRUCTIONS
1.  Continue olanzapine (Zyprexa) 5 mg by mouth at bedtime for psychosis.  2.  Continue melatonin 6 mg by mouth at bedtime for sleep disturbances.    3.  Risks, benefits, alternatives, off-label uses, and side effects of medications have been discussed with patient/caregiver. There is no report of signs/symptoms consistent with medication-induced impairment in daily functioning. At this time, benefits of medication felt to outweigh potential risks. Will continue to reassess need for psychotropic medication at regular 3-6 month intervals.  4. Return to clinic Monday Nov 3, 2025 at 11 AM virtual or earlier if needed. Call [phone] to reschedule.  5. Referral sent to Dr. Aashish Saenz apt 10/27/25    Thank you for seeing me today.  If you have any questions or concerns, do not hesitate to contact my office.  Nel Prieto     TREATMENT TYPE                                      counseling and coordination of care, addressing signs and symptoms of illness; risks/benefits and side effects of medications; and behavioral approaches to illness.  This note was created using electronic dictation. There may be errors in syntax and meaning. Please contact the office with any questions.

## 2025-07-16 ENCOUNTER — APPOINTMENT (OUTPATIENT)
Dept: RADIOLOGY | Facility: CLINIC | Age: 60
End: 2025-07-16
Payer: MEDICARE

## 2025-07-18 ENCOUNTER — APPOINTMENT (OUTPATIENT)
Dept: PRIMARY CARE | Facility: CLINIC | Age: 60
End: 2025-07-18
Payer: MEDICARE

## 2025-07-18 VITALS
WEIGHT: 118 LBS | HEIGHT: 57 IN | DIASTOLIC BLOOD PRESSURE: 76 MMHG | SYSTOLIC BLOOD PRESSURE: 116 MMHG | BODY MASS INDEX: 25.46 KG/M2

## 2025-07-18 DIAGNOSIS — F02.82: ICD-10-CM

## 2025-07-18 DIAGNOSIS — F39: ICD-10-CM

## 2025-07-18 DIAGNOSIS — G30.0: ICD-10-CM

## 2025-07-18 DIAGNOSIS — E11.69 TYPE 2 DIABETES MELLITUS WITH OTHER SPECIFIED COMPLICATION, WITHOUT LONG-TERM CURRENT USE OF INSULIN: Primary | ICD-10-CM

## 2025-07-18 DIAGNOSIS — Q90.9 TRISOMY 21, DOWN SYNDROME (HHS-HCC): ICD-10-CM

## 2025-07-18 LAB — POC HEMOGLOBIN A1C: 6.5 % (ref 4.2–6.5)

## 2025-07-18 PROCEDURE — 3074F SYST BP LT 130 MM HG: CPT | Performed by: INTERNAL MEDICINE

## 2025-07-18 PROCEDURE — 3044F HG A1C LEVEL LT 7.0%: CPT | Performed by: INTERNAL MEDICINE

## 2025-07-18 PROCEDURE — G2211 COMPLEX E/M VISIT ADD ON: HCPCS | Performed by: INTERNAL MEDICINE

## 2025-07-18 PROCEDURE — 3078F DIAST BP <80 MM HG: CPT | Performed by: INTERNAL MEDICINE

## 2025-07-18 PROCEDURE — G8433 SCR FOR DEP NOT CPT DOC RSN: HCPCS | Performed by: INTERNAL MEDICINE

## 2025-07-18 PROCEDURE — 3008F BODY MASS INDEX DOCD: CPT | Performed by: INTERNAL MEDICINE

## 2025-07-18 PROCEDURE — 99214 OFFICE O/P EST MOD 30 MIN: CPT | Performed by: INTERNAL MEDICINE

## 2025-07-18 PROCEDURE — 83036 HEMOGLOBIN GLYCOSYLATED A1C: CPT | Performed by: INTERNAL MEDICINE

## 2025-07-18 PROCEDURE — 1036F TOBACCO NON-USER: CPT | Performed by: INTERNAL MEDICINE

## 2025-07-18 NOTE — PATIENT INSTRUCTIONS
Labs ASAP. This is fasting    A1c-=6.5    If labs are normal, would recommend calling psychiatry for adjustment if neds

## 2025-07-18 NOTE — PROGRESS NOTES
"Subjective   Patient ID: Ene Grant is a 59 y.o. female who presents for 4 month follow-up.    HPI     Here for followup  Staff states has issues going in room as she is hearing voices that tell her to leave  She sleeps in room at night but during day walks in and leaves as has voices that tell her to get out  She is eating  No falls    Has mammogram and ultrasound next week    Has not done her labs  Review of Systems   Unable to perform ROS: Dementia       Objective   /76 (BP Location: Left arm, Patient Position: Sitting, BP Cuff Size: Adult)   Ht (!) 1.448 m (4' 9.01\")   Wt 53.5 kg (118 lb)   BMI 25.53 kg/m²     Physical Exam  Constitutional:       Appearance: Normal appearance.      Comments: Down syndrome faces   HENT:      Mouth/Throat:      Mouth: Mucous membranes are dry.      Comments: Macroglossia     Eyes:      Extraocular Movements: Extraocular movements intact.      Pupils: Pupils are equal, round, and reactive to light.       Cardiovascular:      Rate and Rhythm: Normal rate and regular rhythm.      Heart sounds: Normal heart sounds. No murmur heard.     No gallop.   Pulmonary:      Effort: Pulmonary effort is normal. No respiratory distress.      Breath sounds: Normal breath sounds.   Abdominal:      General: There is no distension.      Palpations: Abdomen is soft. There is no mass.      Tenderness: There is no abdominal tenderness.      Hernia: A hernia (ventral hernia, reducible) is present.     Musculoskeletal:      Left lower leg: No edema.     Neurological:      Mental Status: She is alert.         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, unspecified dementia severity (Multi)     G30.0, F02.82              Vomiting and diarrhea- resolved, had CT and had some inflammation      Weight loss-likely from diet  -stable     Ventral hernia: saw surgery for this     Tremor: med induced, saw neuro, from zyprexa/rexulti  -resolved     Memory changes- saw neuro  -told could be Alzheimer-early changes  -on namenda now     Thyroid nodule: s/p left sided thyroidectomy  -healing well  -TSH WNL 12/22 at ENT at [facility]     Type 2 diabetes: 12/20: 6.0--> 6.8--> 7.1--> 6.8--> 7.7--> 7.6--> 6.6--> 6.1--> 6.4--> 6.5  -Higher doses of metformin cause diarrhea  -On metformin and Jardiance  -saw endo and they agree with our plan (family wanted her to see)  -f/u 3 months     Biscupid aortic valve with AS: seeing Dr. Lewis  -asymptomatic     Leukopenia: saw heme and med induced  -monitor     Hyperlipidemia: on simvastatin and fenofibrate     Hypothyroidism: on synthroid       Behavioral issues: sees psych  -on olanzapine now  -advised if labs normal to followup with psych     Down syndrome: lives at [facility]     Vitamin b12/D deficiency: WNL 12/20     Headache: prn tylenol  GERD: on PPI     Allergies: on zyrtec and flonase     Return 4 months for A1c. . Stressed importance of followup.   Eye doc; Dr. Gonzalez  GI: Dr. Ruiz ([facility])  Derm: Dr. Oneil  ENT-- Dr. YESSICA Bob     Health Maintenance  -Pap smear: sees gyn  -Vaccinations: flu shot UTD. Pneumovax booster, shingrix, and tdap  -Mammogram: 5/25- abnormal- needs to do diagnostic  -Colonoscopy: 11/9/17-normal, repeat 10 years (Dr. Ruiz)-records received  -DEXA: ordered

## 2025-07-31 DIAGNOSIS — E55.9 VITAMIN D DEFICIENCY: ICD-10-CM

## 2025-07-31 DIAGNOSIS — E11.65 TYPE 2 DIABETES MELLITUS WITH HYPERGLYCEMIA, WITHOUT LONG-TERM CURRENT USE OF INSULIN: ICD-10-CM

## 2025-07-31 RX ORDER — EMPAGLIFLOZIN 10 MG/1
10 TABLET, FILM COATED ORAL
Qty: 30 TABLET | Refills: 10 | Status: SHIPPED | OUTPATIENT
Start: 2025-07-31

## 2025-07-31 RX ORDER — ERGOCALCIFEROL 1.25 MG/1
CAPSULE ORAL
Qty: 4 CAPSULE | Refills: 10 | Status: SHIPPED | OUTPATIENT
Start: 2025-07-31

## 2025-08-14 ENCOUNTER — APPOINTMENT (OUTPATIENT)
Dept: RADIOLOGY | Facility: CLINIC | Age: 60
End: 2025-08-14
Payer: MEDICARE

## 2025-08-14 DIAGNOSIS — Z78.0 MENOPAUSE: ICD-10-CM

## 2025-08-14 PROCEDURE — 77080 DXA BONE DENSITY AXIAL: CPT

## 2025-08-21 ENCOUNTER — APPOINTMENT (OUTPATIENT)
Dept: PRIMARY CARE | Facility: CLINIC | Age: 60
End: 2025-08-21
Payer: MEDICARE

## 2025-09-05 ENCOUNTER — HOSPITAL ENCOUNTER (OUTPATIENT)
Dept: RADIOLOGY | Facility: HOSPITAL | Age: 60
Discharge: HOME | End: 2025-09-05
Payer: MEDICARE

## 2025-09-05 DIAGNOSIS — R92.8 ABNORMAL MAMMOGRAM: ICD-10-CM

## 2025-09-05 PROCEDURE — 77065 DX MAMMO INCL CAD UNI: CPT | Mod: LEFT SIDE | Performed by: RADIOLOGY

## 2025-09-05 PROCEDURE — 77061 BREAST TOMOSYNTHESIS UNI: CPT | Mod: LEFT SIDE | Performed by: RADIOLOGY

## 2025-09-05 PROCEDURE — 77061 BREAST TOMOSYNTHESIS UNI: CPT | Mod: LT

## 2025-11-03 ENCOUNTER — APPOINTMENT (OUTPATIENT)
Dept: BEHAVIORAL HEALTH | Facility: CLINIC | Age: 60
End: 2025-11-03
Payer: MEDICARE

## 2025-11-13 ENCOUNTER — APPOINTMENT (OUTPATIENT)
Dept: PRIMARY CARE | Facility: CLINIC | Age: 60
End: 2025-11-13
Payer: MEDICARE

## 2025-12-03 ENCOUNTER — APPOINTMENT (OUTPATIENT)
Dept: NEUROLOGY | Facility: CLINIC | Age: 60
End: 2025-12-03
Payer: MEDICARE